# Patient Record
Sex: MALE | Race: WHITE | NOT HISPANIC OR LATINO | Employment: OTHER | URBAN - METROPOLITAN AREA
[De-identification: names, ages, dates, MRNs, and addresses within clinical notes are randomized per-mention and may not be internally consistent; named-entity substitution may affect disease eponyms.]

---

## 2017-09-11 PROBLEM — 19943007 CIRRHOSIS OF LIVER: Status: ACTIVE | Noted: 2017-08-24

## 2018-08-27 PROBLEM — 166318006 BLOOD CHEMISTRY ABNORMAL: Status: ACTIVE | Noted: 2017-08-24

## 2018-08-27 PROBLEM — 428283002: Status: ACTIVE | Noted: 2018-08-27

## 2018-10-18 PROBLEM — 79962008 DYSKINESIA OF ESOPHAGUS: Status: ACTIVE | Noted: 2018-10-18

## 2019-09-05 PROBLEM — 428054006: Status: ACTIVE | Noted: 2019-09-05

## 2020-01-23 PROBLEM — 267103008: Status: ACTIVE | Noted: 2020-01-23

## 2020-07-14 ENCOUNTER — TELEPHONE ENCOUNTER (OUTPATIENT)
Dept: URBAN - METROPOLITAN AREA CLINIC 35 | Facility: CLINIC | Age: 75
End: 2020-07-14

## 2020-07-21 LAB
ABSOLUTE BASOPHILS: 171
ABSOLUTE EOSINOPHILS: 171
ABSOLUTE LYMPHOCYTES: 1881
ABSOLUTE MONOCYTES: 684
ABSOLUTE NEUTROPHILS: 2793
AFP-L3: (no result)
AFP: 4.9
ALBUMIN/GLOBULIN RATIO: 1.8
ALBUMIN: 4.4
ALKALINE PHOSPHATASE: 51
ALT: 13
AST: 17
BASOPHILS: 3
BILIRUBIN, TOTAL: 1
BUN/CREATININE RATIO: (no result)
CALCIUM: 9.7
CARBON DIOXIDE: 29
CHLORIDE: 104
CREATININE: 1.02
EGFR AFRICAN AMERICAN: 83
EGFR NON-AFR. AMERICAN: 72
EOSINOPHILS: 3
GLOBULIN: 2.4
GLUCOSE: 100
HEMATOCRIT: 48.9
HEMOGLOBIN: 17
INR: 1.1
LYMPHOCYTES: 33
MCH: 29.7
MCHC: 34.8
MCV: 85.5
MONOCYTES: 12
NEUTROPHILS: 49
NOTE: (no result)
PLATELET COUNT: (no result)
PLATELET ESTIMATION: ADEQUATE
POTASSIUM: 4.5
PROTEIN, TOTAL: 6.8
PT: 11
RDW: 14.1
RED BLOOD CELL COUNT: 5.72
SODIUM: 143
UREA NITROGEN (BUN): 18
WHITE BLOOD CELL COUNT: 5.7

## 2020-07-23 ENCOUNTER — OFFICE VISIT (OUTPATIENT)
Dept: URBAN - METROPOLITAN AREA CLINIC 33 | Facility: CLINIC | Age: 75
End: 2020-07-23

## 2020-07-23 ENCOUNTER — TELEPHONE ENCOUNTER (OUTPATIENT)
Dept: URBAN - METROPOLITAN AREA CLINIC 35 | Facility: CLINIC | Age: 75
End: 2020-07-23

## 2020-07-23 VITALS
TEMPERATURE: 97.3 F | RESPIRATION RATE: 17 BRPM | WEIGHT: 220 LBS | DIASTOLIC BLOOD PRESSURE: 74 MMHG | SYSTOLIC BLOOD PRESSURE: 118 MMHG | BODY MASS INDEX: 30.8 KG/M2 | HEART RATE: 76 BPM | OXYGEN SATURATION: 97 % | HEIGHT: 71 IN

## 2020-07-23 RX ORDER — NADOLOL 20 MG/1
1 TABLET TABLET ORAL TWICE A DAY
Qty: 180 TABLET | Refills: 3 | Status: ACTIVE | COMMUNITY
Start: 2017-02-16

## 2020-07-23 RX ORDER — ACETAMINOPHEN 500 MG
AS DIRECTED TABLET ORAL
Status: ACTIVE | COMMUNITY

## 2020-07-23 RX ORDER — DONEPEZIL HYDROCHLORIDE 10 MG/1
1 TABLET AT BEDTIME TABLET, FILM COATED ORAL ONCE A DAY
Qty: 30 | Status: ACTIVE | COMMUNITY

## 2020-07-23 RX ORDER — VITAMIN D 25 MCG
2 TABLETS TAB ORAL DAILY
Status: ACTIVE | COMMUNITY

## 2020-07-23 RX ORDER — GUAIFENESIN 600 MG/1
1 TABLET AS NEEDED TABLET, EXTENDED RELEASE ORAL
Status: ACTIVE | COMMUNITY

## 2020-07-23 RX ORDER — PSEUDOEPHEDRINE HCL 30 MG
1 TABLET AS NEEDED TABLET ORAL
Status: ACTIVE | COMMUNITY

## 2020-07-23 RX ORDER — CALCIUM CARBONATE 500 MG/1
1 TABLET TABLET ORAL AS NEEDED
Status: ACTIVE | COMMUNITY

## 2020-07-23 RX ORDER — OMEPRAZOLE 20 MG/1
1 CAPSULE CAPSULE, DELAYED RELEASE ORAL ONCE A DAY
Qty: 30 | Status: ACTIVE | COMMUNITY

## 2020-07-23 RX ORDER — NADOLOL 20 MG/1
1 TABLET TABLET ORAL TWICE A DAY
OUTPATIENT
Start: 2017-02-16

## 2020-07-23 RX ORDER — ACETAMINOPHEN 325 MG/1
2 TABLETS AS NEEDED TABLET, FILM COATED ORAL
Status: ACTIVE | COMMUNITY

## 2020-07-23 RX ORDER — NAPROXEN SODIUM 220 MG
1 TABLET AS NEEDED TABLET ORAL
Status: DISCONTINUED | COMMUNITY

## 2020-07-23 RX ORDER — PREDNISOLONE ACETATE 10 MG/ML
SUSPENSION/ DROPS OPHTHALMIC
Qty: 5 UNSPECIFIED | Status: ACTIVE | COMMUNITY

## 2020-07-23 NOTE — HPI-MIGRATED HPI
;   ;   ;   ;   ;   ;     Cirrhosis : He continue to take Nadalol 20 mg 1 BID.    He continues to admit RUQ abdominal pain that is a constant dull ache for years.  Admit easy bruising and fatigue.    Patient currently denies jaundice, chills, dizziness.   Currently admit 1-2 normal and formed bowel movements per day. Deneis melena, bolood or mucus in stools.  MELD Score: 8 (7/15/2020) MELD Score-7 (1/13/2020)  Complications of liver disease include: Jaundice: No Hepatic Encephalopathy: No Ascites: No Spontaneous Bacterial Peritonitis: No Variceal hemorrhage: No Portal Vein Thrombosis: No Muscle Mass Loss: No Weight Loss: No Sleep Habits: Sleep is good, 6-8 hours of sleep during the night   Last visit (5/15/2020) Patient presents today review labs, US and follow up of Cirrhosis.  He continue to take Nadalol 20 mg 1 BID.     He continues to admit RUQ abdominal pain that is a constant dull ache for years.  Admit easy bruising and fatigue.    Patient currently denies jaundice, chills, dizziness.   Currently admit 1-2 normal and formed bowel movements per day. Deneis melena, bolood or mucus in stools.  MELD Score-7 (1/13/2020)  Complications of liver disease include: Jaundice: No Hepatic Encephalopathy: No Ascites: No Spontaneous Bacterial Peritonitis: No Variceal hemorrhage: No Portal Vein Thrombosis: No Muscle Mass Loss: No Weight Loss: No  Last visit (9/5/2019) Patient presents today review MRI and follow up of Cirrhosis.   Last visit (08/27/2018) Patient presents today after EGD, Colonoscopy and follow up of Cirrhosis. He report after procedures having a dull to sharp pain in lower abdomen that was occurring in an intermittent pattern.  Symptoms last for seconds to minutes, lasting for a couple days.  He admit relief with flatus and the use of Gas-X.    Patient currently denies jaundice, chills, dizziness. Patient admits easy bruising and fatigue.   He continues to admit RUQ abdominal pain that is a constant dull ache for years  (8/06/2018) MELD Score: 8 (2/26/2018) MELD Score: 7 (8/24/2017) MELD Score: 7 (2/16/2017) MELD Score: 6 (7/14/2016) MELD score: 7    02/26/2018 Patient presents today for a 6 month follow up of labs and RUQ Abdominal Ultrasound.  He continues to admit mild bloating and constant right-sided abdominal pain that radiates to his right flank and back.  Described as a constant dull ache and tenderness to the touch. He notes the bloating will occur with or without over consumption at meal times.   Patient states that the level of pain is 3-4/10 and has remained the same since his last office visit.   Admit RUQ abdominal pain that is a constant dull ache for years.  He denies any changes to his bowel habits. He admits 2-3 bowel movements per day. The stools vary between well formed and soft.  He also denies jaundice, weight loss or gain, or melena. He continue to admit feeling fatigue over the years, worse over the past 2-3 months.  He had a sleep study at [facility] 2 weeks ago.    (2/26/2018) MELD Score: 7 (8/24/2017) MELD Score: 7 (2/16/2017) MELD Score: 6 (7/14/2016) MELD score: 7  Complications of liver disease include: Jaundice: No Hepatic Encephalopathy: No Ascites: No Spontaneous Bacterial Peritonitis: No Variceal hemorrhage: No Portal Vein Thrombosis: No Muscle Mass Loss: No Weight Loss: No   Outside Records: (5/23/2016) LABS: CMP: Glucose (H) 107, Urea Nitrogen (H) 27, BUN/Creatinine Ratio (H) 29.  CBC: RBC (H) 5.82, Hematocrit (H) 52.2.  TSH: Normal. (5/24/2016) LAB: PT/INR: 1.1 Ratio.;   Globus : Admit less frequent episodes of globus sensation.    Last visit (5/15/2020) Admit globus sensation, which he attribute to sinus drainage.  He take an antihistamine prn with relief.     Last visit (9/5/2019) Patient denies a globus sensation at this time.   Last visit (8/27/2018) Patient continues to complain of symptoms of globus. He states that symptoms are worse at night. He is unable to sleep with a CPAP as recommended due to always having to clear his throat of clear mucous.   02/26/2018 Patient continue to admit episodes of globus.   Symptoms occur throughout the day.  He has been treated with Flonase for symptoms by his ENT Dr. Avelino Luz without relief.  He has a feeling of post nasal drip that cause symptom. He states that during episodes he produces mucus that he feels is lodged in his upper esophagus.  Admits associated voice hoarseness.   Of note patient states his ENT Dr. Luz has treated him for a sinus infection over the past 6 months with three rounds of 10 day course of Amoxicillin.   He notes the last dose of Amoxicillin was changed to Bactrim DS  BID x 10 days after a nasal culture revealed resistance to Amoxicillin a week ago. He also is currently using a saline and an antibiotic cream nasal rinse twice daily.;   Heartburn : He admit infrequent episodes of heartburn.  He will take Omeprazole 20 mg 1 QD maybe once a month.    Last visit (5/15/2020) He admit infrequent episodes of heartburn.  He will take Omeprazole 20 mg 1 QD maybe 3 times a month.   Admit increase stress has been an aggravating factor.    Last visit (9/5/2019) Patient admits that he now takes Omeprazole 20 mg daily to help him control his symptoms. He denies any breakthrough symptoms since taking Omeprazole 20 mg.  Last visit (8/27/2018) Patient denies any recent episodes of heartburn. He states that he had one  episode since his last office visit.   He does admit since his last visit there were tow instances that he experienced chest tightness that lasted for one minute. He believes this occurred because he was laying on his stomach. He received immediate relief by taking a Tums.   02/26/2018 Patient continues to admit roughly 2-3 breakthrough episode per month which he would take 1-2 Tums with relief of symptoms.  Last episode occurred a couple months ago. Patient admits associated chest pain, sour eructations, and sensation of food getting stuck in his esophagus during these episodes. Episodes occur infrequently and without provocation.    Admit frequent burping episodes throughout the day over the past 3-4 weeks.   Patient has had an EGD.  Denies having a Barium Swallow Test before.  EGD was performed by Dr. 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   EGD Pathology revealed: Stomach Biopsy: Gastric body/fundus mucosa; no specific histopathologic findings.     ;   Dysphagia : He continue to experience dysphagia that occur occasionally, once every few weks. He will drink water, but it worsen symptoms.    Last visit (5/15/2020) He continue to experience dysphagia that occur occasionally, once biweekly.  He will drink water, but it worsen symptoms.     Last visit (9/5/2019) Patient admits that he experience dysphagia daily. He admits that he will have dysphagia with liquids and solids. He admits are no aggravating factors at this time.   Last visit (8/27/2018) Patient continues to admit infrequent episodes of dysphagia. This occurs with both solids and liquids.   02/26/2018 Patient continue to admit episodes of dysphagia that occur intermittently with solids and liquids. Described as feeling as if something get lodged in his upper esophagus.    Admits having a Barium Swallow Test before, but can not recall when.  ;   Fatty Liver : Patient states that the RUQ pain  Last visit (5/15/2020) He continues to admit RUQ abdominal pain that is a constant dull ache for years.  Last visit (9/5/2019) He continues to admit RUQ abdominal pain that is a constant dull ache for years.  Patient currently denies jaundice, chills, dizziness. Patient admits easy bruising and fatigue.   08/27/2018 Patient was found to have fatty liver via RUQ US Abdomen performed on Feb. 5, 2018. His most recent imaging was 08/06/2018.   Patient currently denies jaundice, chills, dizziness, easy bruising, fatigue.   He continues to admit RUQ abdominal pain that is a constant dull ache for years. ;   Abdominal Pain : Patient presents today for a follow-up office visit from 05/15/2020. Admit lower abdominal pain resolved after he d/c Donepezil HCl 10 MG 3 days ago.  He began that medication 6 months ago and never had any issues until recently.    Of note patient contact the office on (7/14/2020) c/o still having occasional LLQ abdominal pian. Symptoms occasionally aggravated prior to urination and alleviated afterwards.  Also having change in bowel habits over the past 3 months described as fecal urgency with loose BM's most of the time with 3-4 incomplete evacuations during the morning Denies melena, blood in stools.  May have mucus in stools.   He denies use of antibiotics after treatment of diverticulitis in May 2020.      Last Visit (05/15/2020) Patient presents today in office with c/o diverticulitis flare up again.  He admit relief after completing the abx Cipro 500 mg 1 BID and Flagyl 500 mg 1 BID x 10 days)  from [facility] ER visit (3/6/2020), then returned 1.5 weeks later.  Pain located midline just below the umbilicus radiating to LLQ.  Described as a dull pain and pressure, occasional sharp in nature. He also admits a gas pain after he eats.  Will have a BM 30 minutes after eating and doesn't feel that he is completely emptying his bowels.  Stools are not consistent Admit 3-4 BM's per day producing stool and/or gas.    Increase gas throughout the day.  Denies melena, blood or mucus in stools.  Admit relief with flatulence.  Taking Gas-X without relief.  Took an old Amoxicillin this morning. No fever or chills ;

## 2020-08-10 ENCOUNTER — WEB ENCOUNTER (OUTPATIENT)
Dept: URBAN - METROPOLITAN AREA CLINIC 35 | Facility: CLINIC | Age: 75
End: 2020-08-10

## 2020-08-25 ENCOUNTER — WEB ENCOUNTER (OUTPATIENT)
Dept: URBAN - METROPOLITAN AREA CLINIC 35 | Facility: CLINIC | Age: 75
End: 2020-08-25

## 2021-01-13 ENCOUNTER — TELEPHONE ENCOUNTER (OUTPATIENT)
Dept: URBAN - METROPOLITAN AREA CLINIC 35 | Facility: CLINIC | Age: 76
End: 2021-01-13

## 2021-01-14 ENCOUNTER — LAB OUTSIDE AN ENCOUNTER (OUTPATIENT)
Dept: URBAN - METROPOLITAN AREA CLINIC 35 | Facility: CLINIC | Age: 76
End: 2021-01-14

## 2021-01-19 ENCOUNTER — TELEPHONE ENCOUNTER (OUTPATIENT)
Dept: URBAN - METROPOLITAN AREA CLINIC 35 | Facility: CLINIC | Age: 76
End: 2021-01-19

## 2021-01-19 LAB
AFP-L3: (no result)
AFP: 5.6
ALBUMIN/GLOBULIN RATIO: 1.7
ALBUMIN: 4.5
ALKALINE PHOSPHATASE: 53
ALT: 21
AST: 23
BILIRUBIN, TOTAL: 0.6
BUN/CREATININE RATIO: (no result)
CALCIUM: 10.1
CARBON DIOXIDE: 32
CHLORIDE: 105
CREATININE: 1.02
EGFR AFRICAN AMERICAN: 83
EGFR NON-AFR. AMERICAN: 72
GLOBULIN: 2.6
GLUCOSE: 56
INR: 1.1
POTASSIUM: 4.8
PROTEIN, TOTAL: 7.1
PT: 11.1
SODIUM: 143
UREA NITROGEN (BUN): 19

## 2021-01-22 ENCOUNTER — LAB OUTSIDE AN ENCOUNTER (OUTPATIENT)
Dept: URBAN - METROPOLITAN AREA CLINIC 33 | Facility: CLINIC | Age: 76
End: 2021-01-22

## 2021-01-27 ENCOUNTER — TELEPHONE ENCOUNTER (OUTPATIENT)
Dept: URBAN - METROPOLITAN AREA CLINIC 35 | Facility: CLINIC | Age: 76
End: 2021-01-27

## 2021-01-28 ENCOUNTER — OFFICE VISIT (OUTPATIENT)
Dept: URBAN - METROPOLITAN AREA CLINIC 33 | Facility: CLINIC | Age: 76
End: 2021-01-28

## 2021-01-28 ENCOUNTER — TELEPHONE ENCOUNTER (OUTPATIENT)
Dept: URBAN - METROPOLITAN AREA CLINIC 35 | Facility: CLINIC | Age: 76
End: 2021-01-28

## 2021-01-28 VITALS
WEIGHT: 228 LBS | SYSTOLIC BLOOD PRESSURE: 126 MMHG | OXYGEN SATURATION: 98 % | BODY MASS INDEX: 31.92 KG/M2 | DIASTOLIC BLOOD PRESSURE: 76 MMHG | HEART RATE: 68 BPM | HEIGHT: 71 IN

## 2021-01-28 RX ORDER — ACETAMINOPHEN 325 MG/1
2 TABLETS AS NEEDED TABLET, FILM COATED ORAL
Status: ACTIVE | COMMUNITY

## 2021-01-28 RX ORDER — OMEPRAZOLE 20 MG/1
1 CAPSULE CAPSULE, DELAYED RELEASE ORAL ONCE A DAY
Qty: 30 | Status: ACTIVE | COMMUNITY

## 2021-01-28 RX ORDER — NADOLOL 20 MG/1
1 TABLET TABLET ORAL TWICE A DAY
Status: ACTIVE | COMMUNITY
Start: 2017-02-16

## 2021-01-28 RX ORDER — CALCIUM CARBONATE 500 MG/1
1 TABLET TABLET ORAL AS NEEDED
Status: ACTIVE | COMMUNITY

## 2021-01-28 RX ORDER — PREDNISOLONE ACETATE 10 MG/ML
SUSPENSION/ DROPS OPHTHALMIC
Qty: 5 UNSPECIFIED | Status: ACTIVE | COMMUNITY

## 2021-01-28 RX ORDER — DONEPEZIL HYDROCHLORIDE 10 MG/1
1 TABLET AT BEDTIME TABLET, FILM COATED ORAL ONCE A DAY
Qty: 30 | Status: DISCONTINUED | COMMUNITY

## 2021-01-28 RX ORDER — ACETAMINOPHEN 500 MG
AS DIRECTED TABLET ORAL
Status: DISCONTINUED | COMMUNITY

## 2021-01-28 RX ORDER — NADOLOL 20 MG/1
1 TABLET TABLET ORAL TWICE A DAY
OUTPATIENT

## 2021-01-28 RX ORDER — VITAMIN D 25 MCG
2 TABLETS TAB ORAL DAILY
Status: ACTIVE | COMMUNITY

## 2021-01-28 RX ORDER — PSEUDOEPHEDRINE HCL 30 MG
1 TABLET AS NEEDED TABLET ORAL
Status: ACTIVE | COMMUNITY

## 2021-01-28 RX ORDER — TAMSULOSIN HYDROCHLORIDE 0.4 MG/1
1 CAPSULE CAPSULE ORAL ONCE A DAY
Qty: 30 | Status: ACTIVE | COMMUNITY

## 2021-01-28 RX ORDER — GUAIFENESIN 600 MG/1
1 TABLET AS NEEDED TABLET, EXTENDED RELEASE ORAL
Status: ACTIVE | COMMUNITY

## 2021-01-28 NOTE — HPI-MIGRATED HPI
;   ;   ;   ;   ;   ;     Cirrhosis : 75 year old male patient presents today for a 6 month follow up of Cirrhosis and to discuss his lab results.  Patient currently denies RUQ pain, jaundice, chills, dizziness, easy bruising or fatigue. He denies any abdominal or lower extremity swelling.   Admits fatigue.     MELD Score: 8 (01/14/2021)  Complications of liver disease include: Jaundice: No Hepatic Encephalopathy: No Ascites: No Spontaneous Bacterial Peritonitis: No Variceal hemorrhage: No Portal Vein Thrombosis: No Muscle Mass Loss: No Weight Loss: No Sleeping habits: 7-8 hours per night  CT of the Abdomen was completed 12/17/2020 with findings below: No CT findings to explain the patient's clinical report of hematuria. Left renal simple cysts. Colonic diverticulosis. Cirrhotic liver morphology with stigmata of portal hypertension, including a splenorenal shunt.        Last visit (07/23/2020) He continue to take Nadalol 20 mg 1 BID.    He continues to admit RUQ abdominal pain that is a constant dull ache for years.  Admit easy bruising and fatigue.    Patient currently denies jaundice, chills, dizziness.   Currently admit 1-2 normal and formed bowel movements per day. Denies melena, blood or mucus in stools.  MELD Score: 8 (7/15/2020) MELD Score-7 (1/13/2020)  Complications of liver disease include: Jaundice: No Hepatic Encephalopathy: No Ascites: No Spontaneous Bacterial Peritonitis: No Variceal hemorrhage: No Portal Vein Thrombosis: No Muscle Mass Loss: No Weight Loss: No Sleep Habits: Sleep is good, 6-8 hours of sleep during the night   Last visit (5/15/2020) Patient presents today review labs, US and follow up of Cirrhosis.  He continue to take Nadalol 20 mg 1 BID.     He continues to admit RUQ abdominal pain that is a constant dull ache for years.  Admit easy bruising and fatigue.    Patient currently denies jaundice, chills, dizziness.   Currently admit 1-2 normal and formed bowel movements per day. Denies melena, blood or mucus in stools.  MELD Score-7 (1/13/2020)  Complications of liver disease include: Jaundice: No Hepatic Encephalopathy: No Ascites: No Spontaneous Bacterial Peritonitis: No Variceal hemorrhage: No Portal Vein Thrombosis: No Muscle Mass Loss: No Weight Loss: No  Last visit (9/5/2019) Patient presents today review MRI and follow up of Cirrhosis.   Last visit (08/27/2018) Patient presents today after EGD, Colonoscopy and follow up of Cirrhosis. He report after procedures having a dull to sharp pain in lower abdomen that was occurring in an intermittent pattern.  Symptoms last for seconds to minutes, lasting for a couple days.  He admit relief with flatus and the use of Gas-X.    Patient currently denies jaundice, chills, dizziness. Patient admits easy bruising and fatigue.   He continues to admit RUQ abdominal pain that is a constant dull ache for years  (8/06/2018) MELD Score: 8 (2/26/2018) MELD Score: 7 (8/24/2017) MELD Score: 7 (2/16/2017) MELD Score: 6 (7/14/2016) MELD score: 7    02/26/2018 Patient presents today for a 6 month follow up of labs and RUQ Abdominal Ultrasound.  He continues to admit mild bloating and constant right-sided abdominal pain that radiates to his right flank and back.  Described as a constant dull ache and tenderness to the touch. He notes the bloating will occur with or without over consumption at meal times.   Patient states that the level of pain is 3-4/10 and has remained the same since his last office visit.   Admit RUQ abdominal pain that is a constant dull ache for years.  He denies any changes to his bowel habits. He admits 2-3 bowel movements per day. The stools vary between well formed and soft.  He also denies jaundice, weight loss or gain, or melena. He continue to admit feeling fatigue over the years, worse over the past 2-3 months.  He had a sleep study at [facility] 2 weeks ago.    (2/26/2018) MELD Score: 7 (8/24/2017) MELD Score: 7 (2/16/2017) MELD Score: 6 (7/14/2016) MELD score: 7  Complications of liver disease include: Jaundice: No Hepatic Encephalopathy: No Ascites: No Spontaneous Bacterial Peritonitis: No Variceal hemorrhage: No Portal Vein Thrombosis: No Muscle Mass Loss: No Weight Loss: No   Outside Records: (5/23/2016) LABS: CMP: Glucose (H) 107, Urea Nitrogen (H) 27, BUN/Creatinine Ratio (H) 29.  CBC: RBC (H) 5.82, Hematocrit (H) 52.2.  TSH: Normal. (5/24/2016) LAB: PT/INR: 1.1 Ratio.;   Globus : Admits less frequent episodes of a globus sensation since his last office visit.     Last visit (07/23/2020) Admit less frequent episodes of globus sensation.    Last visit (5/15/2020) Admit globus sensation, which he attribute to sinus drainage.  He take an antihistamine prn with relief.     Last visit (9/5/2019) Patient denies a globus sensation at this time.   Last visit (8/27/2018) Patient continues to complain of symptoms of globus. He states that symptoms are worse at night. He is unable to sleep with a CPAP as recommended due to always having to clear his throat of clear mucous.   02/26/2018 Patient continue to admit episodes of globus.   Symptoms occur throughout the day.  He has been treated with Flonase for symptoms by his ENT Dr. Avelino Luz without relief.  He has a feeling of post nasal drip that cause symptom. He states that during episodes he produces mucus that he feels is lodged in his upper esophagus.  Admits associated voice hoarseness.   Of note patient states his ENT Dr. Luz has treated him for a sinus infection over the past 6 months with three rounds of 10 day course of Amoxicillin.   He notes the last dose of Amoxicillin was changed to Bactrim DS  BID x 10 days after a nasal culture revealed resistance to Amoxicillin a week ago. He also is currently using a saline and an antibiotic cream nasal rinse twice daily.;   Heartburn : He admit infrequent episodes of heartburn.  He will take Omeprazole 20 mg 1 QD maybe once a month.   Last visit (07/23/2020) He admit infrequent episodes of heartburn.  He will take Omeprazole 20 mg 1 QD maybe once a month.    Last visit (5/15/2020) He admit infrequent episodes of heartburn.  He will take Omeprazole 20 mg 1 QD maybe 3 times a month.   Admit increase stress has been an aggravating factor.    Last visit (9/5/2019) Patient admits that he now takes Omeprazole 20 mg daily to help him control his symptoms. He denies any breakthrough symptoms since taking Omeprazole 20 mg.  Last visit (8/27/2018) Patient denies any recent episodes of heartburn. He states that he had one  episode since his last office visit.   He does admit since his last visit there were tow instances that he experienced chest tightness that lasted for one minute. He believes this occurred because he was laying on his stomach. He received immediate relief by taking a Tums.   02/26/2018 Patient continues to admit roughly 2-3 breakthrough episode per month which he would take 1-2 Tums with relief of symptoms.  Last episode occurred a couple months ago. Patient admits associated chest pain, sour eructations, and sensation of food getting stuck in his esophagus during these episodes. Episodes occur infrequently and without provocation.    Admit frequent burping episodes throughout the day over the past 3-4 weeks.   Patient has had an EGD.  Denies having a Barium Swallow Test before.  EGD was performed by Dr. 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   EGD Pathology revealed: Stomach Biopsy: Gastric body/fundus mucosa; no specific histopathologic findings.     ;   Dysphagia : Admits continued episodes of dysphagia since his last office visit. He admits 1 episode every 2-3 weeks.      Last visit (07/23/2020) He continue to experience dysphagia that occur occasionally, once every few weks. He will drink water, but it worsen symptoms.    Last visit (5/15/2020) He continue to experience dysphagia that occur occasionally, once biweekly.  He will drink water, but it worsen symptoms.     Last visit (9/5/2019) Patient admits that he experience dysphagia daily. He admits that he will have dysphagia with liquids and solids. He admits are no aggravating factors at this time.   Last visit (8/27/2018) Patient continues to admit infrequent episodes of dysphagia. This occurs with both solids and liquids.   02/26/2018 Patient continue to admit episodes of dysphagia that occur intermittently with solids and liquids. Described as feeling as if something get lodged in his upper esophagus.    Admits having a Barium Swallow Test before, but can not recall when.  ;   Fatty Liver : Patient currently denies RUQ pain, jaundice, chills, dizziness, easy bruising or fatigue.       Last visit (07/23/2020) Patient states that the RUQ pain  Last visit (5/15/2020) He continues to admit RUQ abdominal pain that is a constant dull ache for years.  Last visit (9/5/2019) He continues to admit RUQ abdominal pain that is a constant dull ache for years.  Patient currently denies jaundice, chills, dizziness. Patient admits easy bruising and fatigue.   08/27/2018 Patient was found to have fatty liver via RUQ US Abdomen performed on Feb. 5, 2018. His most recent imaging was 08/06/2018.   Patient currently denies jaundice, chills, dizziness, easy bruising, fatigue.   He continues to admit RUQ abdominal pain that is a constant dull ache for years. ;   Abdominal Pain : Denies any current abdominal pain.     Last visit (07/23/2020) Patient presents today for a follow-up office visit from 05/15/2020. Admit lower abdominal pain resolved after he d/c Donepezil HCl 10 MG 3 days ago.  He began that medication 6 months ago and never had any issues until recently.    Of note patient contact the office on (7/14/2020) c/o still having occasional LLQ abdominal pian. Symptoms occasionally aggravated prior to urination and alleviated afterwards.  Also having change in bowel habits over the past 3 months described as fecal urgency with loose BM's most of the time with 3-4 incomplete evacuations during the morning Denies melena, blood in stools.  May have mucus in stools.   He denies use of antibiotics after treatment of diverticulitis in May 2020.      Last Visit (05/15/2020) Patient presents today in office with c/o diverticulitis flare up again.  He admit relief after completing the abx Cipro 500 mg 1 BID and Flagyl 500 mg 1 BID x 10 days)  from [facility] ER visit (3/6/2020), then returned 1.5 weeks later.  Pain located midline just below the umbilicus radiating to LLQ.  Described as a dull pain and pressure, occasional sharp in nature. He also admits a gas pain after he eats.  Will have a BM 30 minutes after eating and doesn't feel that he is completely emptying his bowels.  Stools are not consistent Admit 3-4 BM's per day producing stool and/or gas.    Increase gas throughout the day.  Denies melena, blood or mucus in stools.  Admit relief with flatulence.  Taking Gas-X without relief.  Took an old Amoxicillin this morning. No fever or chills ;

## 2021-02-02 ENCOUNTER — WEB ENCOUNTER (OUTPATIENT)
Dept: URBAN - METROPOLITAN AREA CLINIC 35 | Facility: CLINIC | Age: 76
End: 2021-02-02

## 2021-02-02 RX ORDER — NADOLOL 20 MG/1
1 TABLET TABLET ORAL TWICE A DAY
Qty: 180 TABLET | Refills: 1 | OUTPATIENT

## 2021-07-08 ENCOUNTER — TELEPHONE ENCOUNTER (OUTPATIENT)
Dept: URBAN - METROPOLITAN AREA CLINIC 35 | Facility: CLINIC | Age: 76
End: 2021-07-08

## 2021-07-12 ENCOUNTER — LAB OUTSIDE AN ENCOUNTER (OUTPATIENT)
Dept: URBAN - METROPOLITAN AREA CLINIC 35 | Facility: CLINIC | Age: 76
End: 2021-07-12

## 2021-07-14 ENCOUNTER — LAB OUTSIDE AN ENCOUNTER (OUTPATIENT)
Dept: URBAN - METROPOLITAN AREA CLINIC 33 | Facility: CLINIC | Age: 76
End: 2021-07-14

## 2021-07-14 ENCOUNTER — TELEPHONE ENCOUNTER (OUTPATIENT)
Dept: URBAN - METROPOLITAN AREA CLINIC 35 | Facility: CLINIC | Age: 76
End: 2021-07-14

## 2021-07-15 LAB
AFP-L3: (no result)
AFP: 4.9
ALBUMIN/GLOBULIN RATIO: 1.5
ALBUMIN: 4
ALKALINE PHOSPHATASE: 54
ALT: 15
AST: 17
BILIRUBIN, TOTAL: 0.7
BUN/CREATININE RATIO: (no result)
CALCIUM: 9.5
CARBON DIOXIDE: 28
CHLORIDE: 105
CREATININE: 0.91
EGFR AFRICAN AMERICAN: 95
EGFR NON-AFR. AMERICAN: 82
GLOBULIN: 2.7
GLUCOSE: 118
INR: 1.1
POTASSIUM: 4.2
PROTEIN, TOTAL: 6.7
PT: 11.2
SODIUM: 141
UREA NITROGEN (BUN): 18

## 2021-07-16 ENCOUNTER — WEB ENCOUNTER (OUTPATIENT)
Dept: URBAN - METROPOLITAN AREA CLINIC 35 | Facility: CLINIC | Age: 76
End: 2021-07-16

## 2021-07-29 ENCOUNTER — OFFICE VISIT (OUTPATIENT)
Dept: URBAN - METROPOLITAN AREA CLINIC 33 | Facility: CLINIC | Age: 76
End: 2021-07-29

## 2021-07-29 VITALS
SYSTOLIC BLOOD PRESSURE: 130 MMHG | HEART RATE: 63 BPM | OXYGEN SATURATION: 97 % | BODY MASS INDEX: 32.34 KG/M2 | HEIGHT: 71 IN | DIASTOLIC BLOOD PRESSURE: 74 MMHG | WEIGHT: 231 LBS

## 2021-07-29 RX ORDER — ACETAMINOPHEN 325 MG/1
2 TABLETS AS NEEDED TABLET, FILM COATED ORAL
Status: ACTIVE | COMMUNITY

## 2021-07-29 RX ORDER — METRONIDAZOLE 500 MG/1
TABLET ORAL
Qty: 30 | Status: ACTIVE | COMMUNITY

## 2021-07-29 RX ORDER — OMEPRAZOLE 20 MG/1
1 CAPSULE CAPSULE, DELAYED RELEASE ORAL ONCE A DAY
Qty: 30 | Status: ACTIVE | COMMUNITY

## 2021-07-29 RX ORDER — NADOLOL 20 MG/1
1 TABLET TABLET ORAL TWICE A DAY
Qty: 180 TABLET | Refills: 1 | Status: ACTIVE | COMMUNITY

## 2021-07-29 RX ORDER — PSEUDOEPHEDRINE HCL 30 MG
1 TABLET AS NEEDED TABLET ORAL
Status: ACTIVE | COMMUNITY

## 2021-07-29 RX ORDER — CALCIUM CARBONATE 500 MG/1
1 TABLET TABLET ORAL AS NEEDED
Status: ACTIVE | COMMUNITY

## 2021-07-29 RX ORDER — PREDNISOLONE ACETATE 10 MG/ML
SUSPENSION/ DROPS OPHTHALMIC
Qty: 5 UNSPECIFIED | Status: ACTIVE | COMMUNITY

## 2021-07-29 RX ORDER — GUAIFENESIN 600 MG/1
1 TABLET AS NEEDED TABLET, EXTENDED RELEASE ORAL
Status: ACTIVE | COMMUNITY

## 2021-07-29 RX ORDER — NADOLOL 20 MG/1
1 TABLET TABLET ORAL TWICE A DAY
OUTPATIENT

## 2021-07-29 RX ORDER — CHOLECALCIFEROL (VITAMIN D3) 50 MCG
1 TABLET TABLET ORAL ONCE A DAY
Qty: 30 | Status: ACTIVE | COMMUNITY

## 2021-07-29 RX ORDER — TAMSULOSIN HYDROCHLORIDE 0.4 MG/1
1 CAPSULE CAPSULE ORAL ONCE A DAY
Qty: 30 | Status: ACTIVE | COMMUNITY

## 2021-07-29 NOTE — HPI-MIGRATED HPI
;   ;   ;   ;   ;   ;   ;     Cirrhosis : Patient presents today for a follow up of Cirrhosis.  He denies any fatigue, easy bruising, decreased appetite, nausea, vomiting, edema, unintentional weight loss, or jaundice.   Most recent labs were completed 7/12/2021.  MELD Score 7 (7/12/2021)   Continue the use of Nadolol, 20 MG, 1 BID.  Complications of liver disease include: Jaundice: No Hepatic Encephalopathy: No Ascites: No Spontaneous Bacterial Peritonitis: No Variceal hemorrhage: No Portal Vein Thrombosis: No Muscle Mass Loss: No Weight Loss: No Sleeping habits: 7-8 hours per night    Last visit (1/28/2021)  75 year old male patient presents today for a 6 month follow up of Cirrhosis and to discuss his lab results.  Patient currently denies RUQ pain, jaundice, chills, dizziness, easy bruising or fatigue. He denies any abdominal or lower extremity swelling.   Admits fatigue.     MELD Score: 8 (01/14/2021)  Complications of liver disease include: Jaundice: No Hepatic Encephalopathy: No Ascites: No Spontaneous Bacterial Peritonitis: No Variceal hemorrhage: No Portal Vein Thrombosis: No Muscle Mass Loss: No Weight Loss: No Sleeping habits: 7-8 hours per night  CT of the Abdomen was completed 12/17/2020 with findings below: No CT findings to explain the patient's clinical report of hematuria. Left renal simple cysts. Colonic diverticulosis. Cirrhotic liver morphology with stigmata of portal hypertension, including a splenorenal shunt.        Last visit (07/23/2020) He continue to take Nadalol 20 mg 1 BID.    He continues to admit RUQ abdominal pain that is a constant dull ache for years.  Admit easy bruising and fatigue.    Patient currently denies jaundice, chills, dizziness.   Currently admit 1-2 normal and formed bowel movements per day. Denies melena, blood or mucus in stools.  MELD Score: 8 (7/15/2020) MELD Score-7 (1/13/2020)  Complications of liver disease include: Jaundice: No Hepatic Encephalopathy: No Ascites: No Spontaneous Bacterial Peritonitis: No Variceal hemorrhage: No Portal Vein Thrombosis: No Muscle Mass Loss: No Weight Loss: No Sleep Habits: Sleep is good, 6-8 hours of sleep during the night   Last visit (5/15/2020) Patient presents today review labs, US and follow up of Cirrhosis.  He continue to take Nadalol 20 mg 1 BID.     He continues to admit RUQ abdominal pain that is a constant dull ache for years.  Admit easy bruising and fatigue.    Patient currently denies jaundice, chills, dizziness.   Currently admit 1-2 normal and formed bowel movements per day. Denies melena, blood or mucus in stools.  MELD Score-7 (1/13/2020)  Complications of liver disease include: Jaundice: No Hepatic Encephalopathy: No Ascites: No Spontaneous Bacterial Peritonitis: No Variceal hemorrhage: No Portal Vein Thrombosis: No Muscle Mass Loss: No Weight Loss: No  Last visit (9/5/2019) Patient presents today review MRI and follow up of Cirrhosis.   Last visit (08/27/2018) Patient presents today after EGD, Colonoscopy and follow up of Cirrhosis. He report after procedures having a dull to sharp pain in lower abdomen that was occurring in an intermittent pattern.  Symptoms last for seconds to minutes, lasting for a couple days.  He admit relief with flatus and the use of Gas-X.    Patient currently denies jaundice, chills, dizziness. Patient admits easy bruising and fatigue.   He continues to admit RUQ abdominal pain that is a constant dull ache for years  (8/06/2018) MELD Score: 8 (2/26/2018) MELD Score: 7 (8/24/2017) MELD Score: 7 (2/16/2017) MELD Score: 6 (7/14/2016) MELD score: 7    02/26/2018 Patient presents today for a 6 month follow up of labs and RUQ Abdominal Ultrasound.  He continues to admit mild bloating and constant right-sided abdominal pain that radiates to his right flank and back.  Described as a constant dull ache and tenderness to the touch. He notes the bloating will occur with or without over consumption at meal times.   Patient states that the level of pain is 3-4/10 and has remained the same since his last office visit.   Admit RUQ abdominal pain that is a constant dull ache for years.  He denies any changes to his bowel habits. He admits 2-3 bowel movements per day. The stools vary between well formed and soft.  He also denies jaundice, weight loss or gain, or melena. He continue to admit feeling fatigue over the years, worse over the past 2-3 months.  He had a sleep study at [facility] 2 weeks ago.    (2/26/2018) MELD Score: 7 (8/24/2017) MELD Score: 7 (2/16/2017) MELD Score: 6 (7/14/2016) MELD score: 7  Complications of liver disease include: Jaundice: No Hepatic Encephalopathy: No Ascites: No Spontaneous Bacterial Peritonitis: No Variceal hemorrhage: No Portal Vein Thrombosis: No Muscle Mass Loss: No Weight Loss: No   Outside Records: (5/23/2016) LABS: CMP: Glucose (H) 107, Urea Nitrogen (H) 27, BUN/Creatinine Ratio (H) 29.  CBC: RBC (H) 5.82, Hematocrit (H) 52.2.  TSH: Normal. (5/24/2016) LAB: PT/INR: 1.1 Ratio.;   Globus : Patient admits continued globus sensation since his last visit.   Described as a sensation of mucus in his esophagus.  Symptoms are more noticeable when laying down.   Last visit (1/28/2021)  Admits less frequent episodes of a globus sensation since his last office visit.     Last visit (07/23/2020) Admit less frequent episodes of globus sensation.    Last visit (5/15/2020) Admit globus sensation, which he attribute to sinus drainage.  He take an antihistamine prn with relief.     Last visit (9/5/2019) Patient denies a globus sensation at this time.   Last visit (8/27/2018) Patient continues to complain of symptoms of globus. He states that symptoms are worse at night. He is unable to sleep with a CPAP as recommended due to always having to clear his throat of clear mucous.   02/26/2018 Patient continue to admit episodes of globus.   Symptoms occur throughout the day.  He has been treated with Flonase for symptoms by his ENT Dr. Avelino Luz without relief.  He has a feeling of post nasal drip that cause symptom. He states that during episodes he produces mucus that he feels is lodged in his upper esophagus.  Admits associated voice hoarseness.   Of note patient states his ENT Dr. Luz has treated him for a sinus infection over the past 6 months with three rounds of 10 day course of Amoxicillin.   He notes the last dose of Amoxicillin was changed to Bactrim DS  BID x 10 days after a nasal culture revealed resistance to Amoxicillin a week ago. He also is currently using a saline and an antibiotic cream nasal rinse twice daily.;   Heartburn : He admits rare break through episodes of heartburn, at which time he will take Tums with relief.    He report rarely will take Omeprazole 20 mg 1 QD.    Last visit (1/28/2021)  He admit infrequent episodes of heartburn.  He will take Omeprazole 20 mg 1 QD maybe once a month.   Last visit (07/23/2020) He admit infrequent episodes of heartburn.  He will take Omeprazole 20 mg 1 QD maybe once a month.    Last visit (5/15/2020) He admit infrequent episodes of heartburn.  He will take Omeprazole 20 mg 1 QD maybe 3 times a month.   Admit increase stress has been an aggravating factor.    Last visit (9/5/2019) Patient admits that he now takes Omeprazole 20 mg daily to help him control his symptoms. He denies any breakthrough symptoms since taking Omeprazole 20 mg.  Last visit (8/27/2018) Patient denies any recent episodes of heartburn. He states that he had one  episode since his last office visit.   He does admit since his last visit there were tow instances that he experienced chest tightness that lasted for one minute. He believes this occurred because he was laying on his stomach. He received immediate relief by taking a Tums.   02/26/2018 Patient continues to admit roughly 2-3 breakthrough episode per month which he would take 1-2 Tums with relief of symptoms.  Last episode occurred a couple months ago. Patient admits associated chest pain, sour eructations, and sensation of food getting stuck in his esophagus during these episodes. Episodes occur infrequently and without provocation.    Admit frequent burping episodes throughout the day over the past 3-4 weeks.   Patient has had an EGD.  Denies having a Barium Swallow Test before.  EGD was performed by Dr. 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   EGD Pathology revealed: Stomach Biopsy: Gastric body/fundus mucosa; no specific histopathologic findings.;   Dysphagia : He admits continued episodes of dysphagia with liquids and solids,  no matter the content.  Will occasionally regurgitate to get relief.    Last visit (1/28/2021)  Admits continued episodes of dysphagia since his last office visit. He admits 1 episode every 2-3 weeks.      Last visit (07/23/2020) He continue to experience dysphagia that occur occasionally, once every few week's. He will drink water, but it worsen symptoms.    Last visit (5/15/2020) He continue to experience dysphagia that occur occasionally, once biweekly.  He will drink water, but it worsen symptoms.     Last visit (9/5/2019) Patient admits that he experience dysphagia daily. He admits that he will have dysphagia with liquids and solids. He admits are no aggravating factors at this time.   Last visit (8/27/2018) Patient continues to admit infrequent episodes of dysphagia. This occurs with both solids and liquids.   02/26/2018 Patient continue to admit episodes of dysphagia that occur intermittently with solids and liquids. Described as feeling as if something get lodged in his upper esophagus.    Admits having a Barium Swallow Test before, but can not recall when.;   Fatty Liver : He admits/denies any jaundice, fatigue, dizziness, RUQ pain, bloating, easy bruising or chills.   Most recent labs were performed 7/12/2021 with LFTs.   Last visit (1/28/2021)  Patient currently denies RUQ pain, jaundice, chills, dizziness, easy bruising or fatigue.       Last visit (07/23/2020) Patient states that the RUQ pain  Last visit (5/15/2020) He continues to admit RUQ abdominal pain that is a constant dull ache for years.  Last visit (9/5/2019) He continues to admit RUQ abdominal pain that is a constant dull ache for years.  Patient currently denies jaundice, chills, dizziness. Patient admits easy bruising and fatigue.   08/27/2018 Patient was found to have fatty liver via RUQ US Abdomen performed on Feb. 5, 2018. His most recent imaging was 08/06/2018.   Patient currently denies jaundice, chills, dizziness, easy bruising, fatigue.   He continues to admit RUQ abdominal pain that is a constant dull ache for years.;   Change in Bowel habits : Patient report change in bowel habits with onset May 2021.  Described as 1-3 loose and watery evacuations with occasional fecal urgency.  Maybe once a month will have a formed stool.   He report having immediate urge to have an evacuation post prandial no matter the content.  Denies melena, blood or mucus in stools, rectal pain/bleeding or pruritus ani.   He report his wife was treated with Vancomycin 2 weeks for positive C. Diff in May 2021, at which time he was treated by PCP with same medication;   Abdominal Pain : He admits continued episodes of lower abdominal pain since his last visit. Described as an intermittent ache that occur randomly throughout the week.  Symptoms will occasionally improve with urination.    He plan on following up with Urologist to r/o prostate related.     Last visit (1/28/2021)  Denies any current abdominal pain.     Last visit (07/23/2020) Patient presents today for a follow-up office visit from 05/15/2020. Admit lower abdominal pain resolved after he d/c Donepezil HCl 10 MG 3 days ago.  He began that medication 6 months ago and never had any issues until recently.    Of note patient contact the office on (7/14/2020) c/o still having occasional LLQ abdominal pian. Symptoms occasionally aggravated prior to urination and alleviated afterwards.  Also having change in bowel habits over the past 3 months described as fecal urgency with loose BM's most of the time with 3-4 incomplete evacuations during the morning Denies melena, blood in stools.  May have mucus in stools.   He denies use of antibiotics after treatment of diverticulitis in May 2020.      Last Visit (05/15/2020) Patient presents today in office with c/o diverticulitis flare up again.  He admit relief after completing the abx Cipro 500 mg 1 BID and Flagyl 500 mg 1 BID x 10 days)  from [facility] ER visit (3/6/2020), then returned 1.5 weeks later.  Pain located midline just below the umbilicus radiating to LLQ.  Described as a dull pain and pressure, occasional sharp in nature. He also admits a gas pain after he eats.  Will have a BM 30 minutes after eating and doesn't feel that he is completely emptying his bowels.  Stools are not consistent Admit 3-4 BM's per day producing stool and/or gas.    Increase gas throughout the day.  Denies melena, blood or mucus in stools.  Admit relief with flatulence.  Taking Gas-X without relief.  Took an old Amoxicillin this morning. No fever or chills;

## 2021-07-30 ENCOUNTER — WEB ENCOUNTER (OUTPATIENT)
Dept: URBAN - METROPOLITAN AREA CLINIC 35 | Facility: CLINIC | Age: 76
End: 2021-07-30

## 2021-08-02 ENCOUNTER — WEB ENCOUNTER (OUTPATIENT)
Dept: URBAN - METROPOLITAN AREA CLINIC 35 | Facility: CLINIC | Age: 76
End: 2021-08-02

## 2021-08-03 ENCOUNTER — TELEPHONE ENCOUNTER (OUTPATIENT)
Dept: URBAN - METROPOLITAN AREA CLINIC 35 | Facility: CLINIC | Age: 76
End: 2021-08-03

## 2021-08-03 RX ORDER — NADOLOL 20 MG/1
1 TABLET TABLET ORAL TWICE A DAY
Qty: 180 TABLET | Refills: 1 | OUTPATIENT

## 2021-08-05 ENCOUNTER — WEB ENCOUNTER (OUTPATIENT)
Dept: URBAN - METROPOLITAN AREA CLINIC 35 | Facility: CLINIC | Age: 76
End: 2021-08-05

## 2021-08-09 ENCOUNTER — TELEPHONE ENCOUNTER (OUTPATIENT)
Dept: URBAN - METROPOLITAN AREA CLINIC 35 | Facility: CLINIC | Age: 76
End: 2021-08-09

## 2021-08-24 ENCOUNTER — WEB ENCOUNTER (OUTPATIENT)
Dept: URBAN - METROPOLITAN AREA CLINIC 35 | Facility: CLINIC | Age: 76
End: 2021-08-24

## 2021-08-30 ENCOUNTER — WEB ENCOUNTER (OUTPATIENT)
Dept: URBAN - METROPOLITAN AREA CLINIC 35 | Facility: CLINIC | Age: 76
End: 2021-08-30

## 2021-09-29 ENCOUNTER — WEB ENCOUNTER (OUTPATIENT)
Dept: URBAN - METROPOLITAN AREA CLINIC 35 | Facility: CLINIC | Age: 76
End: 2021-09-29

## 2021-10-08 ENCOUNTER — WEB ENCOUNTER (OUTPATIENT)
Dept: URBAN - METROPOLITAN AREA CLINIC 35 | Facility: CLINIC | Age: 76
End: 2021-10-08

## 2021-10-12 ENCOUNTER — WEB ENCOUNTER (OUTPATIENT)
Dept: URBAN - METROPOLITAN AREA CLINIC 35 | Facility: CLINIC | Age: 76
End: 2021-10-12

## 2021-10-18 ENCOUNTER — WEB ENCOUNTER (OUTPATIENT)
Dept: URBAN - METROPOLITAN AREA CLINIC 35 | Facility: CLINIC | Age: 76
End: 2021-10-18

## 2021-10-19 ENCOUNTER — WEB ENCOUNTER (OUTPATIENT)
Dept: URBAN - METROPOLITAN AREA CLINIC 35 | Facility: CLINIC | Age: 76
End: 2021-10-19

## 2021-10-20 ENCOUNTER — TELEPHONE ENCOUNTER (OUTPATIENT)
Dept: URBAN - METROPOLITAN AREA CLINIC 35 | Facility: CLINIC | Age: 76
End: 2021-10-20

## 2021-10-21 ENCOUNTER — OFFICE VISIT (OUTPATIENT)
Dept: URBAN - METROPOLITAN AREA CLINIC 33 | Facility: CLINIC | Age: 76
End: 2021-10-21

## 2021-10-21 VITALS
BODY MASS INDEX: 31.92 KG/M2 | SYSTOLIC BLOOD PRESSURE: 128 MMHG | HEIGHT: 71 IN | DIASTOLIC BLOOD PRESSURE: 82 MMHG | OXYGEN SATURATION: 97 % | HEART RATE: 69 BPM | WEIGHT: 228 LBS

## 2021-10-21 PROBLEM — 301716002 LEFT LOWER QUADRANT PAIN: Status: ACTIVE | Noted: 2021-07-29

## 2021-10-21 PROBLEM — 91109007 GASTRIC VARICES: Status: ACTIVE | Noted: 2019-09-05

## 2021-10-21 PROBLEM — 62315008: Status: ACTIVE | Noted: 2021-07-29

## 2021-10-21 RX ORDER — CALCIUM CARBONATE 500 MG/1
1 TABLET TABLET ORAL AS NEEDED
Status: ACTIVE | COMMUNITY

## 2021-10-21 RX ORDER — METRONIDAZOLE 500 MG/1
TABLET ORAL
Qty: 30 | Status: ON HOLD | COMMUNITY

## 2021-10-21 RX ORDER — NADOLOL 20 MG/1
1 TABLET TABLET ORAL TWICE A DAY
Qty: 180 TABLET | Refills: 1 | Status: ACTIVE | COMMUNITY

## 2021-10-21 RX ORDER — ACETAMINOPHEN 325 MG/1
2 TABLETS AS NEEDED TABLET, FILM COATED ORAL
Status: ACTIVE | COMMUNITY

## 2021-10-21 RX ORDER — PSEUDOEPHEDRINE HCL 30 MG
1 TABLET AS NEEDED TABLET ORAL
Status: ACTIVE | COMMUNITY

## 2021-10-21 RX ORDER — PREDNISOLONE ACETATE 10 MG/ML
SUSPENSION/ DROPS OPHTHALMIC
Qty: 5 UNSPECIFIED | Status: ACTIVE | COMMUNITY

## 2021-10-21 RX ORDER — CHOLECALCIFEROL (VITAMIN D3) 50 MCG
1 TABLET TABLET ORAL ONCE A DAY
Qty: 30 | Status: ACTIVE | COMMUNITY

## 2021-10-21 RX ORDER — FAMOTIDINE 40 MG/1
1 TABLET AT BEDTIME TABLET, FILM COATED ORAL ONCE A DAY
Qty: 90 TABLET | Refills: 1 | OUTPATIENT
Start: 2021-10-21

## 2021-10-21 RX ORDER — OMEPRAZOLE 20 MG/1
1 CAPSULE CAPSULE, DELAYED RELEASE ORAL ONCE A DAY
Qty: 30 | Status: ACTIVE | COMMUNITY

## 2021-10-21 RX ORDER — TAMSULOSIN HYDROCHLORIDE 0.4 MG/1
1 CAPSULE CAPSULE ORAL ONCE A DAY
Qty: 30 | Status: ACTIVE | COMMUNITY

## 2021-10-21 RX ORDER — GUAIFENESIN 600 MG/1
1 TABLET AS NEEDED TABLET, EXTENDED RELEASE ORAL
Status: ACTIVE | COMMUNITY

## 2021-10-21 NOTE — HPI-MIGRATED HPI
;   ;   ;   ;   ;   ;   ;     Cirrhosis : Patient presents today for a follow up of Cirrhosis.  He denies any appetite, nausea, vomiting, edema, unintentional weight loss, or jaundice.   He admits RUQ, fatigue, easy brusing, decreased,   Most recent labs were completed 7/12/2021.  MELD Score 7 (7/12/2021)   Continue the use of Nadolol, 20 MG, 1 BID.  Complications of liver disease include: Jaundice: No Hepatic Encephalopathy: No Ascites: No Spontaneous Bacterial Peritonitis: No Variceal hemorrhage: No Portal Vein Thrombosis: No Muscle Mass Loss: No Weight Loss: No Sleeping habits: 7-8 hours per night    Last visit (1/28/2021)  75 year old male patient presents today for a 6 month follow up of Cirrhosis and to discuss his lab results.  Patient currently denies RUQ pain, jaundice, chills, dizziness, easy bruising or fatigue. He denies any abdominal or lower extremity swelling.   Admits fatigue.     MELD Score: 8 (01/14/2021)  Complications of liver disease include: Jaundice: No Hepatic Encephalopathy: No Ascites: No Spontaneous Bacterial Peritonitis: No Variceal hemorrhage: No Portal Vein Thrombosis: No Muscle Mass Loss: No Weight Loss: No Sleeping habits: 7-8 hours per night  CT of the Abdomen was completed 12/17/2020 with findings below: No CT findings to explain the patient's clinical report of hematuria. Left renal simple cysts. Colonic diverticulosis. Cirrhotic liver morphology with stigmata of portal hypertension, including a splenorenal shunt.        Last visit (07/23/2020) He continue to take Nadalol 20 mg 1 BID.    He continues to admit RUQ abdominal pain that is a constant dull ache for years.  Admit easy bruising and fatigue.    Patient currently denies jaundice, chills, dizziness.   Currently admit 1-2 normal and formed bowel movements per day. Denies melena, blood or mucus in stools.  MELD Score: 8 (7/15/2020) MELD Score-7 (1/13/2020)  Complications of liver disease include: Jaundice: No Hepatic Encephalopathy: No Ascites: No Spontaneous Bacterial Peritonitis: No Variceal hemorrhage: No Portal Vein Thrombosis: No Muscle Mass Loss: No Weight Loss: No Sleep Habits: Sleep is good, 6-8 hours of sleep during the night   Last visit (5/15/2020) Patient presents today review labs, US and follow up of Cirrhosis.  He continue to take Nadalol 20 mg 1 BID.     He continues to admit RUQ abdominal pain that is a constant dull ache for years.  Admit easy bruising and fatigue.    Patient currently denies jaundice, chills, dizziness.   Currently admit 1-2 normal and formed bowel movements per day. Denies melena, blood or mucus in stools.  MELD Score-7 (1/13/2020)  Complications of liver disease include: Jaundice: No Hepatic Encephalopathy: No Ascites: No Spontaneous Bacterial Peritonitis: No Variceal hemorrhage: No Portal Vein Thrombosis: No Muscle Mass Loss: No Weight Loss: No  Last visit (9/5/2019) Patient presents today review MRI and follow up of Cirrhosis.   Last visit (08/27/2018) Patient presents today after EGD, Colonoscopy and follow up of Cirrhosis. He report after procedures having a dull to sharp pain in lower abdomen that was occurring in an intermittent pattern.  Symptoms last for seconds to minutes, lasting for a couple days.  He admit relief with flatus and the use of Gas-X.    Patient currently denies jaundice, chills, dizziness. Patient admits easy bruising and fatigue.   He continues to admit RUQ abdominal pain that is a constant dull ache for years  (8/06/2018) MELD Score: 8 (2/26/2018) MELD Score: 7 (8/24/2017) MELD Score: 7 (2/16/2017) MELD Score: 6 (7/14/2016) MELD score: 7    02/26/2018 Patient presents today for a 6 month follow up of labs and RUQ Abdominal Ultrasound.  He continues to admit mild bloating and constant right-sided abdominal pain that radiates to his right flank and back.  Described as a constant dull ache and tenderness to the touch. He notes the bloating will occur with or without over consumption at meal times.   Patient states that the level of pain is 3-4/10 and has remained the same since his last office visit.   Admit RUQ abdominal pain that is a constant dull ache for years.  He denies any changes to his bowel habits. He admits 2-3 bowel movements per day. The stools vary between well formed and soft.  He also denies jaundice, weight loss or gain, or melena. He continue to admit feeling fatigue over the years, worse over the past 2-3 months.  He had a sleep study at [facility] 2 weeks ago.    (2/26/2018) MELD Score: 7 (8/24/2017) MELD Score: 7 (2/16/2017) MELD Score: 6 (7/14/2016) MELD score: 7  Complications of liver disease include: Jaundice: No Hepatic Encephalopathy: No Ascites: No Spontaneous Bacterial Peritonitis: No Variceal hemorrhage: No Portal Vein Thrombosis: No Muscle Mass Loss: No Weight Loss: No   Outside Records: (5/23/2016) LABS: CMP: Glucose (H) 107, Urea Nitrogen (H) 27, BUN/Creatinine Ratio (H) 29.  CBC: RBC (H) 5.82, Hematocrit (H) 52.2.  TSH: Normal. (5/24/2016) LAB: PT/INR: 1.1 Ratio.;   Globus : Patient admits continued globus sensation since his last visit.   Described as a sensation of mucus in his esophagus.  Symptoms are more noticeable when laying down.   Last visit (1/28/2021)  Admits less frequent episodes of a globus sensation since his last office visit.     Last visit (07/23/2020) Admit less frequent episodes of globus sensation.    Last visit (5/15/2020) Admit globus sensation, which he attribute to sinus drainage.  He take an antihistamine prn with relief.     Last visit (9/5/2019) Patient denies a globus sensation at this time.   Last visit (8/27/2018) Patient continues to complain of symptoms of globus. He states that symptoms are worse at night. He is unable to sleep with a CPAP as recommended due to always having to clear his throat of clear mucous.   02/26/2018 Patient continue to admit episodes of globus.   Symptoms occur throughout the day.  He has been treated with Flonase for symptoms by his ENT Dr. Avelino Luz without relief.  He has a feeling of post nasal drip that cause symptom. He states that during episodes he produces mucus that he feels is lodged in his upper esophagus.  Admits associated voice hoarseness.   Of note patient states his ENT Dr. Luz has treated him for a sinus infection over the past 6 months with three rounds of 10 day course of Amoxicillin.   He notes the last dose of Amoxicillin was changed to Bactrim DS  BID x 10 days after a nasal culture revealed resistance to Amoxicillin a week ago. He also is currently using a saline and an antibiotic cream nasal rinse twice daily.;   Heartburn : He admits rare break through episodes of heartburn, at which time he will take Tums with relief.    He report rarely will take Omeprazole 20 mg 1 QD.    Last visit (1/28/2021)  He admit infrequent episodes of heartburn.  He will take Omeprazole 20 mg 1 QD maybe once a month.   Last visit (07/23/2020) He admit infrequent episodes of heartburn.  He will take Omeprazole 20 mg 1 QD maybe once a month.    Last visit (5/15/2020) He admit infrequent episodes of heartburn.  He will take Omeprazole 20 mg 1 QD maybe 3 times a month.   Admit increase stress has been an aggravating factor.    Last visit (9/5/2019) Patient admits that he now takes Omeprazole 20 mg daily to help him control his symptoms. He denies any breakthrough symptoms since taking Omeprazole 20 mg.  Last visit (8/27/2018) Patient denies any recent episodes of heartburn. He states that he had one  episode since his last office visit.   He does admit since his last visit there were tow instances that he experienced chest tightness that lasted for one minute. He believes this occurred because he was laying on his stomach. He received immediate relief by taking a Tums.   02/26/2018 Patient continues to admit roughly 2-3 breakthrough episode per month which he would take 1-2 Tums with relief of symptoms.  Last episode occurred a couple months ago. Patient admits associated chest pain, sour eructations, and sensation of food getting stuck in his esophagus during these episodes. Episodes occur infrequently and without provocation.    Admit frequent burping episodes throughout the day over the past 3-4 weeks.   Patient has had an EGD.  Denies having a Barium Swallow Test before.  EGD was performed by Dr. 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   EGD Pathology revealed: Stomach Biopsy: Gastric body/fundus mucosa; no specific histopathologic findings.;   Dysphagia : He admits continued episodes of dysphagia with liquids and solids,  no matter the content.  Patient reports for the last 2 months if feels as if he has sand stuck in his throat.   Last visit (1/28/2021)  Admits continued episodes of dysphagia since his last office visit. He admits 1 episode every 2-3 weeks.      Last visit (07/23/2020) He continue to experience dysphagia that occur occasionally, once every few week's. He will drink water, but it worsen symptoms.    Last visit (5/15/2020) He continue to experience dysphagia that occur occasionally, once biweekly.  He will drink water, but it worsen symptoms.     Last visit (9/5/2019) Patient admits that he experience dysphagia daily. He admits that he will have dysphagia with liquids and solids. He admits are no aggravating factors at this time.   Last visit (8/27/2018) Patient continues to admit infrequent episodes of dysphagia. This occurs with both solids and liquids.   02/26/2018 Patient continue to admit episodes of dysphagia that occur intermittently with solids and liquids. Described as feeling as if something get lodged in his upper esophagus.    Admits having a Barium Swallow Test before, but can not recall when.;   Fatty Liver : He denies any jaundice, fatigue, dizziness, RUQ pain, bloating, easy bruising or chills.   Most recent labs were performed 7/12/2021 with LFTs.   Last visit (1/28/2021)  Patient currently denies RUQ pain, jaundice, chills, dizziness, easy bruising or fatigue.       Last visit (07/23/2020) Patient states that the RUQ pain  Last visit (5/15/2020) He continues to admit RUQ abdominal pain that is a constant dull ache for years.  Last visit (9/5/2019) He continues to admit RUQ abdominal pain that is a constant dull ache for years.  Patient currently denies jaundice, chills, dizziness. Patient admits easy bruising and fatigue.   08/27/2018 Patient was found to have fatty liver via RUQ US Abdomen performed on Feb. 5, 2018. His most recent imaging was 08/06/2018.   Patient currently denies jaundice, chills, dizziness, easy bruising, fatigue.   He continues to admit RUQ abdominal pain that is a constant dull ache for years.;   Change in Bowel habits : He denies that his bowel habits have returned to normal at this time he reports 1-4 bowel movements a day with no strain. His stools are most often loose without blood or mucus present.   He denies trying to take anything to help bulk up his stools.    Last visit (1/28/2021) Patient report change in bowel habits with onset May 2021.  Described as 1-3 loose and watery evacuations with occasional fecal urgency.  Maybe once a month will have a formed stool.   He report having immediate urge to have an evacuation post prandial no matter the content.  Denies melena, blood or mucus in stools, rectal pain/bleeding or pruritus ani.   He report his wife was treated with Vancomycin 2 weeks for positive C. Diff in May 2021, at which time he was treated by PCP with same medication;   Abdominal Pain : He admits continued episodes of lower abdominal pain since his last visit. Described as an intermittent ache that occur randomly throughout the week.  Symptoms will occasionally improve with urination. He reports symptoms will decrease after a bowel movement.   Patient reports pro prandial no matter the content of the food.   He admits seeing a urogologist a month ago and reports prostate was ok per pateint.     Last visit (1/28/2021)  Denies any current abdominal pain.     Last visit (07/23/2020) Patient presents today for a follow-up office visit from 05/15/2020. Admit lower abdominal pain resolved after he d/c Donepezil HCl 10 MG 3 days ago.  He began that medication 6 months ago and never had any issues until recently.    Of note patient contact the office on (7/14/2020) c/o still having occasional LLQ abdominal pian. Symptoms occasionally aggravated prior to urination and alleviated afterwards.  Also having change in bowel habits over the past 3 months described as fecal urgency with loose BM's most of the time with 3-4 incomplete evacuations during the morning Denies melena, blood in stools.  May have mucus in stools.   He denies use of antibiotics after treatment of diverticulitis in May 2020.      Last Visit (05/15/2020) Patient presents today in office with c/o diverticulitis flare up again.  He admit relief after completing the abx Cipro 500 mg 1 BID and Flagyl 500 mg 1 BID x 10 days)  from [facility] ER visit (3/6/2020), then returned 1.5 weeks later.  Pain located midline just below the umbilicus radiating to LLQ.  Described as a dull pain and pressure, occasional sharp in nature. He also admits a gas pain after he eats.  Will have a BM 30 minutes after eating and doesn't feel that he is completely emptying his bowels.  Stools are not consistent Admit 3-4 BM's per day producing stool and/or gas.    Increase gas throughout the day.  Denies melena, blood or mucus in stools.  Admit relief with flatulence.  Taking Gas-X without relief.  Took an old Amoxicillin this morning. No fever or chills;

## 2021-10-25 ENCOUNTER — WEB ENCOUNTER (OUTPATIENT)
Dept: URBAN - METROPOLITAN AREA CLINIC 35 | Facility: CLINIC | Age: 76
End: 2021-10-25

## 2021-10-26 LAB
AFP-L3: (no result)
AFP: 5.2
ALBUMIN/GLOBULIN RATIO: 1.5
ALBUMIN: 4.2
ALKALINE PHOSPHATASE: 53
ALT: 14
AST: 17
BILIRUBIN, TOTAL: 0.7
BUN/CREATININE RATIO: (no result)
CALCIUM: 9.7
CARBON DIOXIDE: 30
CHLORIDE: 103
CREATININE: 0.9
EGFR AFRICAN AMERICAN: 96
EGFR NON-AFR. AMERICAN: 83
GLOBULIN: 2.8
GLUCOSE: 72
INR: 1.1
POTASSIUM: 4.1
PROTEIN, TOTAL: 7
PT: 11.1
SODIUM: 140
UREA NITROGEN (BUN): 16

## 2021-10-28 ENCOUNTER — WEB ENCOUNTER (OUTPATIENT)
Dept: URBAN - METROPOLITAN AREA CLINIC 35 | Facility: CLINIC | Age: 76
End: 2021-10-28

## 2021-10-28 ENCOUNTER — TELEPHONE ENCOUNTER (OUTPATIENT)
Dept: URBAN - METROPOLITAN AREA CLINIC 35 | Facility: CLINIC | Age: 76
End: 2021-10-28

## 2021-10-28 RX ORDER — VANCOMYCIN HYDROCHLORIDE 125 MG/1
AS DIRECTED CAPSULE ORAL QID
Qty: 40 | Refills: 0 | OUTPATIENT
Start: 2021-10-29

## 2021-10-28 RX ORDER — METRONIDAZOLE 500 MG/1
1 TABLET TABLET ORAL THREE TIMES A DAY
Qty: 42 TABLET | Refills: 0 | OUTPATIENT
Start: 2021-10-29

## 2021-10-29 ENCOUNTER — TELEPHONE ENCOUNTER (OUTPATIENT)
Dept: URBAN - METROPOLITAN AREA CLINIC 35 | Facility: CLINIC | Age: 76
End: 2021-10-29

## 2021-10-29 RX ORDER — METRONIDAZOLE 500 MG/1
1 TABLET TABLET ORAL THREE TIMES A DAY
Qty: 42 TABLET | Refills: 0 | OUTPATIENT
Start: 2021-10-29

## 2021-11-03 ENCOUNTER — WEB ENCOUNTER (OUTPATIENT)
Dept: URBAN - METROPOLITAN AREA CLINIC 35 | Facility: CLINIC | Age: 76
End: 2021-11-03

## 2021-11-05 ENCOUNTER — WEB ENCOUNTER (OUTPATIENT)
Dept: URBAN - METROPOLITAN AREA CLINIC 35 | Facility: CLINIC | Age: 76
End: 2021-11-05

## 2021-11-11 ENCOUNTER — WEB ENCOUNTER (OUTPATIENT)
Dept: URBAN - METROPOLITAN AREA CLINIC 35 | Facility: CLINIC | Age: 76
End: 2021-11-11

## 2021-11-12 ENCOUNTER — WEB ENCOUNTER (OUTPATIENT)
Dept: URBAN - METROPOLITAN AREA CLINIC 35 | Facility: CLINIC | Age: 76
End: 2021-11-12

## 2021-11-14 ENCOUNTER — WEB ENCOUNTER (OUTPATIENT)
Dept: URBAN - METROPOLITAN AREA CLINIC 35 | Facility: CLINIC | Age: 76
End: 2021-11-14

## 2021-11-15 ENCOUNTER — WEB ENCOUNTER (OUTPATIENT)
Dept: URBAN - METROPOLITAN AREA CLINIC 35 | Facility: CLINIC | Age: 76
End: 2021-11-15

## 2022-01-04 ENCOUNTER — TELEPHONE ENCOUNTER (OUTPATIENT)
Dept: URBAN - METROPOLITAN AREA CLINIC 36 | Facility: CLINIC | Age: 77
End: 2022-01-04

## 2022-01-05 ENCOUNTER — TELEPHONE ENCOUNTER (OUTPATIENT)
Dept: URBAN - METROPOLITAN AREA CLINIC 33 | Facility: CLINIC | Age: 77
End: 2022-01-05

## 2022-01-20 ENCOUNTER — LAB OUTSIDE AN ENCOUNTER (OUTPATIENT)
Dept: URBAN - METROPOLITAN AREA CLINIC 33 | Facility: CLINIC | Age: 77
End: 2022-01-20

## 2022-02-07 ENCOUNTER — TELEPHONE ENCOUNTER (OUTPATIENT)
Dept: URBAN - METROPOLITAN AREA CLINIC 36 | Facility: CLINIC | Age: 77
End: 2022-02-07

## 2022-02-07 RX ORDER — NADOLOL 20 MG/1
1 TABLET TABLET ORAL TWICE A DAY
Qty: 180 TABLET | Refills: 3

## 2022-02-08 ENCOUNTER — TELEPHONE ENCOUNTER (OUTPATIENT)
Dept: URBAN - METROPOLITAN AREA CLINIC 36 | Facility: CLINIC | Age: 77
End: 2022-02-08

## 2022-04-04 ENCOUNTER — LAB OUTSIDE AN ENCOUNTER (OUTPATIENT)
Dept: URBAN - METROPOLITAN AREA CLINIC 33 | Facility: CLINIC | Age: 77
End: 2022-04-04

## 2022-04-05 ENCOUNTER — TELEPHONE ENCOUNTER (OUTPATIENT)
Dept: URBAN - METROPOLITAN AREA CLINIC 36 | Facility: CLINIC | Age: 77
End: 2022-04-05

## 2022-04-20 LAB
A/G RATIO: 1.7
AFP, SERUM: 4.9
AFP-L3%, SERUM: (no result)
ALBUMIN: 4.3
ALKALINE PHOSPHATASE: 55
ALT (SGPT): 15
AST (SGOT): 17
BILIRUBIN, TOTAL: 1
BUN/CREATININE RATIO: (no result)
BUN: 19
CALCIUM: 9.4
CARBON DIOXIDE, TOTAL: 26
CHLORIDE: 106
CREATININE: 0.95
EGFR AFRICAN AMERICAN: 90
EGFR NON-AFR. AMERICAN: 77
GLOBULIN, TOTAL: 2.5
GLUCOSE: 96
INR: 1
POTASSIUM: 4.1
PROTEIN, TOTAL: 6.8
PT: 10.8
SODIUM: 141

## 2022-04-21 ENCOUNTER — OFFICE VISIT (OUTPATIENT)
Dept: URBAN - METROPOLITAN AREA CLINIC 33 | Facility: CLINIC | Age: 77
End: 2022-04-21
Payer: MEDICARE

## 2022-04-21 VITALS
HEART RATE: 68 BPM | SYSTOLIC BLOOD PRESSURE: 122 MMHG | DIASTOLIC BLOOD PRESSURE: 76 MMHG | WEIGHT: 228 LBS | OXYGEN SATURATION: 97 % | BODY MASS INDEX: 31.92 KG/M2 | HEIGHT: 71 IN

## 2022-04-21 DIAGNOSIS — R19.7 DIARRHEA, UNSPECIFIED TYPE: ICD-10-CM

## 2022-04-21 DIAGNOSIS — I85.00 ESOPHAGEAL VARICES WITHOUT BLEEDING: ICD-10-CM

## 2022-04-21 DIAGNOSIS — K57.32 DIVERTICULITIS OF LARGE INTESTINE WITHOUT PERFORATION OR ABSCESS WITHOUT BLEEDING: ICD-10-CM

## 2022-04-21 DIAGNOSIS — K74.60 UNSPECIFIED CIRRHOSIS OF LIVER: ICD-10-CM

## 2022-04-21 DIAGNOSIS — B18.2 CHRONIC VIRAL HEPATITIS C: ICD-10-CM

## 2022-04-21 DIAGNOSIS — R10.32 LEFT LOWER QUADRANT PAIN: ICD-10-CM

## 2022-04-21 DIAGNOSIS — K76.6 PORTAL HYPERTENSION: ICD-10-CM

## 2022-04-21 DIAGNOSIS — K76.0 FATTY (CHANGE OF) LIVER, NOT ELSEWHERE CLASSIFIED: ICD-10-CM

## 2022-04-21 DIAGNOSIS — R19.8 GLOBUS SENSATION: ICD-10-CM

## 2022-04-21 DIAGNOSIS — I86.4 GASTRIC VARICES: ICD-10-CM

## 2022-04-21 DIAGNOSIS — R13.14 PHARYNGOESOPHAGEAL DYSPHAGIA: ICD-10-CM

## 2022-04-21 PROCEDURE — 99214 OFFICE O/P EST MOD 30 MIN: CPT | Performed by: INTERNAL MEDICINE

## 2022-04-21 RX ORDER — PREDNISOLONE ACETATE 10 MG/ML
SUSPENSION/ DROPS OPHTHALMIC
Qty: 5 UNSPECIFIED | Status: ACTIVE | COMMUNITY

## 2022-04-21 RX ORDER — NADOLOL 20 MG/1
1 TABLET TABLET ORAL TWICE A DAY
Qty: 180 TABLET | Refills: 3 | Status: ACTIVE | COMMUNITY

## 2022-04-21 RX ORDER — FAMOTIDINE 40 MG/1
1 TABLET AT BEDTIME TABLET, FILM COATED ORAL ONCE A DAY
Qty: 90 TABLET | Refills: 1 | OUTPATIENT

## 2022-04-21 RX ORDER — METRONIDAZOLE 500 MG/1
1 TABLET TABLET ORAL THREE TIMES A DAY
Qty: 42 TABLET | Refills: 0 | Status: ACTIVE | COMMUNITY
Start: 2021-10-29

## 2022-04-21 RX ORDER — CALCIUM CARBONATE 500 MG/1
1 TABLET TABLET ORAL AS NEEDED
Status: ACTIVE | COMMUNITY

## 2022-04-21 RX ORDER — TAMSULOSIN HYDROCHLORIDE 0.4 MG/1
1 CAPSULE CAPSULE ORAL ONCE A DAY
Qty: 30 | Status: ACTIVE | COMMUNITY

## 2022-04-21 RX ORDER — VANCOMYCIN HYDROCHLORIDE 125 MG/1
AS DIRECTED CAPSULE ORAL QID
Qty: 40 | Refills: 0 | Status: DISCONTINUED | COMMUNITY
Start: 2021-10-29

## 2022-04-21 RX ORDER — FAMOTIDINE 40 MG/1
1 TABLET AT BEDTIME TABLET, FILM COATED ORAL ONCE A DAY
Qty: 90 TABLET | Refills: 1 | Status: ACTIVE | COMMUNITY
Start: 2021-10-21

## 2022-04-21 RX ORDER — GUAIFENESIN 600 MG/1
1 TABLET AS NEEDED TABLET, EXTENDED RELEASE ORAL
Status: ACTIVE | COMMUNITY

## 2022-04-21 RX ORDER — PSEUDOEPHEDRINE HCL 30 MG
1 TABLET AS NEEDED TABLET ORAL
Status: ACTIVE | COMMUNITY

## 2022-04-21 RX ORDER — CHOLECALCIFEROL (VITAMIN D3) 50 MCG
1 TABLET TABLET ORAL ONCE A DAY
Qty: 30 | Status: ACTIVE | COMMUNITY

## 2022-04-21 RX ORDER — OMEPRAZOLE 20 MG/1
1 CAPSULE CAPSULE, DELAYED RELEASE ORAL ONCE A DAY
Qty: 30 | Status: ACTIVE | COMMUNITY

## 2022-04-21 RX ORDER — ACETAMINOPHEN 325 MG/1
2 TABLETS AS NEEDED TABLET, FILM COATED ORAL
Status: ACTIVE | COMMUNITY

## 2022-04-21 NOTE — HPI-GLOBUS
He continue to admit globus sensation.  Last visit (10/21/2021) Globus: Patient admits continued globus sensation since his last visit. Described as a sensation of mucus in his esophagus. Symptoms are more noticeable when laying down.

## 2022-04-21 NOTE — HPI-CIRRHOSIS
Patient presents today for a follow up of Cirrhosis and review Labs, US RUQ.  He admit improvement in  appetite, nausea, vomiting, edema, unintentional weight loss, or jaundice.  He continue to admit a constant dull pain in RUQ, abdominal. fatigue, easy brusing.  MELD Score 7 (4/14/2022)  Continue the use of Nadolol, 20 MG, 1 BID. Complications of liver disease include: Jaundice: No Hepatic Encephalopathy: No Ascites: No Spontaneous Bacterial Peritonitis: No Variceal hemorrhage: No Portal Vein Thrombosis: No Muscle Mass Loss: No Weight Loss: No Sleeping habits: 6-7 hours per night    Last visit (10/21/2021) Cirrhosis: Patient presents today for a follow up of Cirrhosis. He denies any appetite, nausea, vomig.ting, edema, unintentional weight loss, or jaundice.  He admits RUQ, fatigue, easy brusing Most recent labs were completed 7/12/2021.  MELD Score 7 (7/12/2021)  Continue the use of Nadolol, 20 MG, 1 BID. Complications of liver disease include: Jaundice: No Hepatic Encephalopathy: No Ascites: No Spontaneous Bacterial Peritonitis: No Variceal hemorrhage: No Portal Vein Thrombosis: No Muscle Mass Loss: No Weight Loss: No Sleeping habits: 7-8 hours per night

## 2022-04-21 NOTE — HPI-FATTY LIVER
He denies any jaundice, fatigue, dizziness,  bloating, easy bruising or chills. Last visit (10/21/2021) Fatty Liver: He denies any jaundice, fatigue, dizziness, RUQ pain, bloating, easy bruising or chills. Last visit (10/21/2021) Heartburn: He admits rare break through episodes of heartburn, at which time he will take Tums with relief. He report rarely will take Omeprazole 20 mg 1 QD. Most recent labs were performed 7/12/2021 with LFTs.

## 2022-04-21 NOTE — HPI-CHANGE IN BOWEL HABITS
Currently admits 2-3 bowel movements per day with normal and formed stools. Denies melena, blood or mucus in stool, rectal pain/bleeding or pruritus ani.   Last visit (10/21/2021) Change in Bowel habits: He denies that his bowel habits have returned to normal at this time he reports 1-4 bowel movements a day with no strain. His stools are most often loose without blood or mucus present. He denies trying to take anything to help bulk up his stools.

## 2022-04-21 NOTE — HPI-DYSPHAGIA
Admit periodic episodes of dysphagia with solids and liquids.  Occasionally has to regurgitate food to get relief. Last visit (10/21/2021)  He admits continued episodes of dysphagia with liquids and solids, no matter the content

## 2022-04-21 NOTE — HPI-PERSONAL HISTORY OF COLON POLYPS
He has a personal history of colon polyps.  Last Colonoscopy (10/01/2018) Descending polyp: Tubular Adenoma, Sigmoid polyp: Hyperplastic changes, Hemorrhoids. appropriate

## 2022-05-05 ENCOUNTER — TELEPHONE ENCOUNTER (OUTPATIENT)
Dept: URBAN - METROPOLITAN AREA CLINIC 33 | Facility: CLINIC | Age: 77
End: 2022-05-05

## 2022-05-05 ENCOUNTER — LAB OUTSIDE AN ENCOUNTER (OUTPATIENT)
Dept: URBAN - METROPOLITAN AREA CLINIC 33 | Facility: CLINIC | Age: 77
End: 2022-05-05

## 2022-07-06 ENCOUNTER — TELEPHONE ENCOUNTER (OUTPATIENT)
Dept: URBAN - METROPOLITAN AREA CLINIC 35 | Facility: CLINIC | Age: 77
End: 2022-07-06

## 2022-07-08 ENCOUNTER — TELEPHONE ENCOUNTER (OUTPATIENT)
Dept: URBAN - METROPOLITAN AREA CLINIC 35 | Facility: CLINIC | Age: 77
End: 2022-07-08

## 2022-07-30 ENCOUNTER — WEB ENCOUNTER (OUTPATIENT)
Dept: URBAN - METROPOLITAN AREA CLINIC 33 | Facility: CLINIC | Age: 77
End: 2022-07-30

## 2022-09-20 ENCOUNTER — WEB ENCOUNTER (OUTPATIENT)
Dept: URBAN - METROPOLITAN AREA CLINIC 33 | Facility: CLINIC | Age: 77
End: 2022-09-20

## 2022-10-10 ENCOUNTER — LAB OUTSIDE AN ENCOUNTER (OUTPATIENT)
Dept: URBAN - METROPOLITAN AREA CLINIC 33 | Facility: CLINIC | Age: 77
End: 2022-10-10

## 2022-10-27 ENCOUNTER — OFFICE VISIT (OUTPATIENT)
Dept: URBAN - METROPOLITAN AREA CLINIC 33 | Facility: CLINIC | Age: 77
End: 2022-10-27

## 2022-11-23 ENCOUNTER — WEB ENCOUNTER (OUTPATIENT)
Dept: URBAN - METROPOLITAN AREA CLINIC 33 | Facility: CLINIC | Age: 77
End: 2022-11-23

## 2022-11-29 ENCOUNTER — TELEPHONE ENCOUNTER (OUTPATIENT)
Dept: URBAN - METROPOLITAN AREA CLINIC 36 | Facility: CLINIC | Age: 77
End: 2022-11-29

## 2022-12-05 ENCOUNTER — OFFICE VISIT (OUTPATIENT)
Dept: URBAN - METROPOLITAN AREA CLINIC 33 | Facility: CLINIC | Age: 77
End: 2022-12-05
Payer: MEDICARE

## 2022-12-05 VITALS
SYSTOLIC BLOOD PRESSURE: 124 MMHG | HEART RATE: 74 BPM | OXYGEN SATURATION: 96 % | HEIGHT: 71 IN | DIASTOLIC BLOOD PRESSURE: 80 MMHG | WEIGHT: 221 LBS | BODY MASS INDEX: 30.94 KG/M2

## 2022-12-05 DIAGNOSIS — I85.00 ESOPHAGEAL VARICES WITHOUT BLEEDING: ICD-10-CM

## 2022-12-05 DIAGNOSIS — K76.0 FATTY (CHANGE OF) LIVER, NOT ELSEWHERE CLASSIFIED: ICD-10-CM

## 2022-12-05 DIAGNOSIS — R10.32 LEFT LOWER QUADRANT PAIN: ICD-10-CM

## 2022-12-05 DIAGNOSIS — R19.7 DIARRHEA, UNSPECIFIED TYPE: ICD-10-CM

## 2022-12-05 DIAGNOSIS — R13.14 PHARYNGOESOPHAGEAL DYSPHAGIA: ICD-10-CM

## 2022-12-05 DIAGNOSIS — B18.2 CHRONIC VIRAL HEPATITIS C: ICD-10-CM

## 2022-12-05 DIAGNOSIS — K74.60 UNSPECIFIED CIRRHOSIS OF LIVER: ICD-10-CM

## 2022-12-05 DIAGNOSIS — K76.6 PORTAL HYPERTENSION: ICD-10-CM

## 2022-12-05 DIAGNOSIS — R19.8 GLOBUS SENSATION: ICD-10-CM

## 2022-12-05 DIAGNOSIS — K57.32 DIVERTICULITIS OF LARGE INTESTINE WITHOUT PERFORATION OR ABSCESS WITHOUT BLEEDING: ICD-10-CM

## 2022-12-05 DIAGNOSIS — I86.4 GASTRIC VARICES: ICD-10-CM

## 2022-12-05 PROBLEM — 111359004 DIVERTICULITIS OF COLON: Status: ACTIVE | Noted: 2020-05-17

## 2022-12-05 PROBLEM — 197321007 FATTY LIVER: Status: ACTIVE | Noted: 2017-02-16

## 2022-12-05 PROBLEM — 40739000: Status: ACTIVE | Noted: 2019-09-05

## 2022-12-05 PROCEDURE — 99214 OFFICE O/P EST MOD 30 MIN: CPT | Performed by: INTERNAL MEDICINE

## 2022-12-05 RX ORDER — CHOLECALCIFEROL (VITAMIN D3) 50 MCG
1 TABLET TABLET ORAL ONCE A DAY
Qty: 30 | Status: ACTIVE | COMMUNITY

## 2022-12-05 RX ORDER — FAMOTIDINE 40 MG/1
1 TABLET AT BEDTIME TABLET, FILM COATED ORAL ONCE A DAY
Qty: 90 TABLET | Refills: 1 | Status: ACTIVE | COMMUNITY

## 2022-12-05 RX ORDER — PREDNISOLONE ACETATE 10 MG/ML
SUSPENSION/ DROPS OPHTHALMIC
Qty: 5 UNSPECIFIED | Status: ACTIVE | COMMUNITY

## 2022-12-05 RX ORDER — ACETAMINOPHEN 325 MG/1
2 TABLETS AS NEEDED TABLET, FILM COATED ORAL
Status: ACTIVE | COMMUNITY

## 2022-12-05 RX ORDER — OMEPRAZOLE 20 MG/1
1 CAPSULE CAPSULE, DELAYED RELEASE ORAL ONCE A DAY
Qty: 30 | Status: ACTIVE | COMMUNITY

## 2022-12-05 RX ORDER — FAMOTIDINE 40 MG/1
1 TABLET AT BEDTIME TABLET, FILM COATED ORAL ONCE A DAY
Qty: 90 TABLET | Refills: 1 | OUTPATIENT

## 2022-12-05 RX ORDER — NADOLOL 20 MG/1
1 TABLET TABLET ORAL TWICE A DAY
Qty: 180 TABLET | Refills: 3 | Status: ACTIVE | COMMUNITY

## 2022-12-05 RX ORDER — METRONIDAZOLE 500 MG/1
1 TABLET TABLET ORAL THREE TIMES A DAY
Qty: 42 TABLET | Refills: 0 | Status: ON HOLD | COMMUNITY
Start: 2021-10-29

## 2022-12-05 RX ORDER — TAMSULOSIN HYDROCHLORIDE 0.4 MG/1
1 CAPSULE CAPSULE ORAL ONCE A DAY
Qty: 30 | Status: ACTIVE | COMMUNITY

## 2022-12-05 RX ORDER — GUAIFENESIN 600 MG/1
1 TABLET AS NEEDED TABLET, EXTENDED RELEASE ORAL
Status: ACTIVE | COMMUNITY

## 2022-12-05 RX ORDER — PSEUDOEPHEDRINE HCL 30 MG
1 TABLET AS NEEDED TABLET ORAL
Status: ACTIVE | COMMUNITY

## 2022-12-05 RX ORDER — CALCIUM CARBONATE 500 MG/1
1 TABLET TABLET ORAL AS NEEDED
Status: ACTIVE | COMMUNITY

## 2022-12-05 NOTE — HPI-GLOBUS
Patient admits improvement in globus sensation since his last visit.   Last visit (4/21/2022)  He continue to admit globus sensation.

## 2022-12-05 NOTE — HPI-CHANGE IN BOWEL HABITS
Patient admits bowel habits have not returned to normal. Patient states his symptoms varies. He states that his bowel are random. Currently reports 2-4 bowel movements a day without strain. Patient states after every meal he has the urgency to go. His stools varies between loose/normal without the presence of blood, mucus, or melena. He denies any rectal pain or pruritus ani.   Patient admits associated abdominal pain. Patient states the pain is located in the lower abdomen region. He describes the pain as an sharp pain. Patient states his pain disappears after having a bowel movement.   Last visit (4/21/2022)  Currently admits 2-3 bowel movements per day with normal and formed stools. Denies melena, blood or mucus in stool, rectal pain/bleeding or pruritus ani.

## 2022-12-05 NOTE — HPI-PERSONAL HISTORY OF COLON POLYPS
Last visit (4/21/2022)  He has a personal history of colon polyps.  Last Colonoscopy (10/01/2018) Descending polyp: Tubular Adenoma, Sigmoid polyp: Hyperplastic changes, Hemorrhoids.

## 2022-12-05 NOTE — HPI-CIRRHOSIS
He denies any associated symptoms at this time. Patient denies completing any labs since his last visit.    Patient admits continued improvement in appetite, nausea, vomiting, edema, unintentional weight loss, and jaundice.  Last visit (4/21/2022)  Patient presents today for a follow up of Cirrhosis and review Labs, US RUQ.  He admit improvement in  appetite, nausea, vomiting, edema, unintentional weight loss, or jaundice.  He continue to admit a constant dull pain in RUQ, abdominal. fatigue, easy brusing.

## 2022-12-05 NOTE — HPI-DYSPHAGIA
He admits continued episodes of dysphagia at this time. Patient reports last episode was 2 months ago.   Last visit (4/21/2022)  Admit periodic episodes of dysphagia with solids and liquids.  Occasionally has to regurgitate food to get relief.

## 2022-12-05 NOTE — HPI-FATTY LIVER
He continues to deny any jaundice, fatigue, dizziness,  bloating, easy bruising or chills.   Last visit (4/21/2022)  He denies any jaundice, fatigue, dizziness,  bloating, easy bruising or chills.

## 2023-01-09 ENCOUNTER — ERX REFILL RESPONSE (OUTPATIENT)
Dept: URBAN - METROPOLITAN AREA CLINIC 35 | Facility: CLINIC | Age: 78
End: 2023-01-09

## 2023-01-09 RX ORDER — NADOLOL 20 MG/1
1 TABLET TABLET ORAL TWICE A DAY
Qty: 180 TABLET | Refills: 3 | OUTPATIENT

## 2023-01-19 ENCOUNTER — WEB ENCOUNTER (OUTPATIENT)
Dept: URBAN - METROPOLITAN AREA CLINIC 33 | Facility: CLINIC | Age: 78
End: 2023-01-19

## 2023-01-20 ENCOUNTER — TELEPHONE ENCOUNTER (OUTPATIENT)
Dept: URBAN - METROPOLITAN AREA CLINIC 6 | Facility: CLINIC | Age: 78
End: 2023-01-20

## 2023-03-06 ENCOUNTER — OFFICE VISIT (OUTPATIENT)
Dept: URBAN - METROPOLITAN AREA CLINIC 33 | Facility: CLINIC | Age: 78
End: 2023-03-06

## 2023-04-10 ENCOUNTER — DASHBOARD ENCOUNTERS (OUTPATIENT)
Age: 78
End: 2023-04-10

## 2023-04-10 ENCOUNTER — OFFICE VISIT (OUTPATIENT)
Dept: URBAN - METROPOLITAN AREA CLINIC 33 | Facility: CLINIC | Age: 78
End: 2023-04-10
Payer: MEDICARE

## 2023-04-10 VITALS — WEIGHT: 221.8 LBS | HEIGHT: 71 IN | BODY MASS INDEX: 31.05 KG/M2

## 2023-04-10 DIAGNOSIS — R10.32 LEFT LOWER QUADRANT PAIN: ICD-10-CM

## 2023-04-10 DIAGNOSIS — I85.00 ESOPHAGEAL VARICES WITHOUT BLEEDING: ICD-10-CM

## 2023-04-10 DIAGNOSIS — K57.32 DIVERTICULITIS OF LARGE INTESTINE WITHOUT PERFORATION OR ABSCESS WITHOUT BLEEDING: ICD-10-CM

## 2023-04-10 DIAGNOSIS — K74.60 UNSPECIFIED CIRRHOSIS OF LIVER: ICD-10-CM

## 2023-04-10 DIAGNOSIS — R19.7 DIARRHEA, UNSPECIFIED TYPE: ICD-10-CM

## 2023-04-10 DIAGNOSIS — K76.6 PORTAL HYPERTENSION: ICD-10-CM

## 2023-04-10 DIAGNOSIS — K76.0 FATTY (CHANGE OF) LIVER, NOT ELSEWHERE CLASSIFIED: ICD-10-CM

## 2023-04-10 DIAGNOSIS — B18.2 CHRONIC VIRAL HEPATITIS C: ICD-10-CM

## 2023-04-10 DIAGNOSIS — I86.4 GASTRIC VARICES: ICD-10-CM

## 2023-04-10 DIAGNOSIS — R19.8 GLOBUS SENSATION: ICD-10-CM

## 2023-04-10 DIAGNOSIS — R13.14 PHARYNGOESOPHAGEAL DYSPHAGIA: ICD-10-CM

## 2023-04-10 PROCEDURE — 99214 OFFICE O/P EST MOD 30 MIN: CPT | Performed by: INTERNAL MEDICINE

## 2023-04-10 RX ORDER — FAMOTIDINE 40 MG/1
1 TABLET AT BEDTIME TABLET, FILM COATED ORAL ONCE A DAY
Qty: 90 TABLET | Refills: 1 | Status: ON HOLD | COMMUNITY

## 2023-04-10 RX ORDER — GUAIFENESIN 600 MG/1
1 TABLET AS NEEDED TABLET, EXTENDED RELEASE ORAL
Status: ACTIVE | COMMUNITY

## 2023-04-10 RX ORDER — B-COMPLEX WITH VITAMIN C
1 TABLET TABLET ORAL ONCE A DAY
Status: ACTIVE | COMMUNITY

## 2023-04-10 RX ORDER — ACETAMINOPHEN 325 MG/1
2 TABLETS AS NEEDED TABLET, FILM COATED ORAL
Status: ACTIVE | COMMUNITY

## 2023-04-10 RX ORDER — FAMOTIDINE 40 MG/1
1 TABLET AT BEDTIME TABLET, FILM COATED ORAL ONCE A DAY
Qty: 90 TABLET | Refills: 1 | OUTPATIENT

## 2023-04-10 RX ORDER — CALCIUM CARBONATE 500 MG/1
1 TABLET TABLET ORAL AS NEEDED
Status: ACTIVE | COMMUNITY

## 2023-04-10 RX ORDER — TAMSULOSIN HYDROCHLORIDE 0.4 MG/1
1 CAPSULE CAPSULE ORAL ONCE A DAY
Qty: 30 | Status: ACTIVE | COMMUNITY

## 2023-04-10 RX ORDER — METRONIDAZOLE 500 MG/1
1 TABLET TABLET ORAL THREE TIMES A DAY
Qty: 42 TABLET | Refills: 0 | Status: ON HOLD | COMMUNITY
Start: 2021-10-29

## 2023-04-10 RX ORDER — PSEUDOEPHEDRINE HCL 30 MG
1 TABLET AS NEEDED TABLET ORAL
Status: ACTIVE | COMMUNITY

## 2023-04-10 RX ORDER — CHOLECALCIFEROL (VITAMIN D3) 50 MCG
1 TABLET TABLET ORAL ONCE A DAY
Qty: 30 | Status: ACTIVE | COMMUNITY

## 2023-04-10 RX ORDER — OMEPRAZOLE 20 MG/1
1 CAPSULE CAPSULE, DELAYED RELEASE ORAL ONCE A DAY
Qty: 30 | Status: ACTIVE | COMMUNITY

## 2023-04-10 RX ORDER — NADOLOL 20 MG/1
1 TABLET TABLET ORAL TWICE A DAY
Qty: 180 TABLET | Refills: 3 | Status: ACTIVE | COMMUNITY

## 2023-04-10 RX ORDER — PREDNISOLONE ACETATE 10 MG/ML
SUSPENSION/ DROPS OPHTHALMIC
Qty: 5 UNSPECIFIED | Status: ACTIVE | COMMUNITY

## 2023-04-10 NOTE — HPI-FATTY LIVER
He denies any associated symptoms of jaundice, fatigue, dizziness, , bloating, easy bruising. Admits lower abdominal pain and chills,  Most recent labs were performed (12/15/22). Normal Range  (Last Visit 12.05.2022) He continues to deny any jaundice, fatigue, dizziness,  bloating, easy bruising or chills.   Last visit (4/21/2022)  He denies any jaundice, fatigue, dizziness,  bloating, easy bruising or chills.

## 2023-04-10 NOTE — HPI-GLOBUS
He continues the use of Famotidine 40mg with/out impproved symptoms.   (Last Visit 12.05.2022) Patient admits improvement in globus sensation since his last visit.   Last visit (4/21/2022)  He continue to admit globus sensation.

## 2023-04-10 NOTE — HPI-CIRRHOSIS
Patient presents today for a 4 month follow up of cirrhosis. He admits/denies any recent episodes of fatigue, easy bruising, decreased appetite, nausea, vomiting, edema, unintentional weight loss, or jaundice. Admits/Denies any shortness of breath.    Most recent labs were completed 12/15/2022.  Normal Range     MELD Score:  Complications of liver disease include:  Jaundice: No  Hepatic Encephalopathy (lose of Brain function due to liver not working) : No  Ascites (abdominal fluid): No  Spontaneous Bacterial Peritonitis (ascites fluid infection): No  Variceal hemorrhage (bleeding within the veins due to high B/P): No  Portal Vein Thrombosis (blockage or narrowing of the portal vein): No  Muscle Mass Loss: Yes Weight Loss: No  Sleeping habits: 6-7 hours per night.    (Last Visit 12.05.2022) He denies any associated symptoms at this time. Patient denies completing any labs since his last visit.    Patient admits continued improvement in appetite, nausea, vomiting, edema, unintentional weight loss, and jaundice.  Last visit (4/21/2022)  Patient presents today for a follow up of Cirrhosis and review Labs, US RUQ.  He admit improvement in  appetite, nausea, vomiting, edema, unintentional weight loss, or jaundice.  He continue to admit a constant dull pain in RUQ, abdominal. fatigue, easy brusing.

## 2023-04-10 NOTE — HPI-DYSPHAGIA
Patient admits any recent episodes of dysphagia. He reports his last episode was month in half ago and has occured 1 -2 times a month.   (Last Visit 12.05.2022) He admits continued episodes of dysphagia at this time. Patient reports last episode was 2 months ago.   Last visit (4/21/2022)  Admit periodic episodes of dysphagia with solids and liquids.  Occasionally has to regurgitate food to get relief.

## 2023-04-10 NOTE — HPI-CHANGE IN BOWEL HABITS
He continues to denies improvement with his bowel habits. Currently reports 2-3 bowel movement a day, without strain. Her stools are formed, loose and watery without the presence of blood, mucus, and melena. When patient has a bowel movement he does feel like he is completely emptying his bowels. Denies fecal incontinence (conscious/unconscious). Denies any rectal pain or pruritus ani.  (Last Visit 12.05.2022) Patient admits bowel habits have not returned to normal. Patient states his symptoms varies. He states that his bowel are random. Currently reports 2-4 bowel movements a day without strain. Patient states after every meal he has the urgency to go. His stools varies between loose/normal without the presence of blood, mucus, or melena. He denies any rectal pain or pruritus ani.   Patient admits associated abdominal pain. Patient states the pain is located in the lower abdomen region. He describes the pain as an sharp pain. Patient states his pain disappears after having a bowel movement.   Last visit (4/21/2022)  Currently admits 2-3 bowel movements per day with normal and formed stools. Denies melena, blood or mucus in stool, rectal pain/bleeding or pruritus ani.

## 2023-04-18 LAB
A/G RATIO: 1.7
AFP, SERUM, TUMOR MARKER: 4.8
ALBUMIN: 4.3
ALKALINE PHOSPHATASE: 61
ALT (SGPT): 15
AST (SGOT): 19
BILIRUBIN, TOTAL: 0.7
BUN/CREATININE RATIO: (no result)
BUN: 19
CALCIUM: 9.7
CARBON DIOXIDE, TOTAL: 27
CHLORIDE: 105
CREATININE: 0.9
EGFR: 88
GLOBULIN, TOTAL: 2.6
GLUCOSE: 88
INR: 1
POTASSIUM: 4.4
PROTEIN, TOTAL: 6.9
PT: 10.8
SODIUM: 143

## 2023-04-21 LAB — CLOSTRIDIUM DIFFICILE TOXINB,QL REAL TIME PCR: NOT DETECTED

## 2023-04-23 ENCOUNTER — TELEPHONE ENCOUNTER (OUTPATIENT)
Dept: URBAN - METROPOLITAN AREA CLINIC 35 | Facility: CLINIC | Age: 78
End: 2023-04-23

## 2023-10-02 ENCOUNTER — LAB OUTSIDE AN ENCOUNTER (OUTPATIENT)
Dept: URBAN - METROPOLITAN AREA CLINIC 33 | Facility: CLINIC | Age: 78
End: 2023-10-02

## 2023-10-05 ENCOUNTER — LAB OUTSIDE AN ENCOUNTER (OUTPATIENT)
Dept: URBAN - METROPOLITAN AREA CLINIC 33 | Facility: CLINIC | Age: 78
End: 2023-10-05

## 2023-10-19 ENCOUNTER — OFFICE VISIT (OUTPATIENT)
Dept: URBAN - METROPOLITAN AREA CLINIC 33 | Facility: CLINIC | Age: 78
End: 2023-10-19

## 2023-11-07 LAB
A/G RATIO: 1.7
AFP, SERUM, TUMOR MARKER: 5.3
ALBUMIN: 4.3
ALKALINE PHOSPHATASE: 58
ALT (SGPT): 15
AST (SGOT): 18
BILIRUBIN, TOTAL: 0.6
BUN/CREATININE RATIO: (no result)
BUN: 22
CALCIUM: 9.6
CARBON DIOXIDE, TOTAL: 29
CHLORIDE: 106
CREATININE: 0.92
EGFR: 85
GLOBULIN, TOTAL: 2.5
GLUCOSE: 79
INR: 1.1
POTASSIUM: 4.5
PROTEIN, TOTAL: 6.8
PT: 11.3
SODIUM: 143

## 2023-11-08 ENCOUNTER — TELEPHONE ENCOUNTER (OUTPATIENT)
Dept: URBAN - METROPOLITAN AREA CLINIC 36 | Facility: CLINIC | Age: 78
End: 2023-11-08

## 2023-11-09 ENCOUNTER — OFFICE VISIT (OUTPATIENT)
Dept: URBAN - METROPOLITAN AREA CLINIC 33 | Facility: CLINIC | Age: 78
End: 2023-11-09

## 2023-11-20 ENCOUNTER — TELEPHONE ENCOUNTER (OUTPATIENT)
Dept: URBAN - METROPOLITAN AREA CLINIC 35 | Facility: CLINIC | Age: 78
End: 2023-11-20